# Patient Record
Sex: FEMALE | Race: WHITE
[De-identification: names, ages, dates, MRNs, and addresses within clinical notes are randomized per-mention and may not be internally consistent; named-entity substitution may affect disease eponyms.]

---

## 2017-01-23 ENCOUNTER — TRANSCRIPTION ENCOUNTER (OUTPATIENT)
Age: 47
End: 2017-01-23

## 2017-01-26 ENCOUNTER — APPOINTMENT (OUTPATIENT)
Dept: HEMATOLOGY ONCOLOGY | Facility: CLINIC | Age: 47
End: 2017-01-26

## 2017-01-26 VITALS
DIASTOLIC BLOOD PRESSURE: 97 MMHG | RESPIRATION RATE: 14 BRPM | WEIGHT: 174 LBS | SYSTOLIC BLOOD PRESSURE: 152 MMHG | TEMPERATURE: 98.9 F | HEART RATE: 65 BPM | BODY MASS INDEX: 30.83 KG/M2 | HEIGHT: 63 IN

## 2017-01-26 DIAGNOSIS — Z80.7 FAMILY HISTORY OF OTHER MALIGNANT NEOPLASMS OF LYMPHOID, HEMATOPOIETIC AND RELATED TISSUES: ICD-10-CM

## 2017-01-26 DIAGNOSIS — Z80.3 FAMILY HISTORY OF MALIGNANT NEOPLASM OF BREAST: ICD-10-CM

## 2017-01-26 DIAGNOSIS — K21.9 GASTRO-ESOPHAGEAL REFLUX DISEASE W/OUT ESOPHAGITIS: ICD-10-CM

## 2017-01-26 DIAGNOSIS — E78.00 PURE HYPERCHOLESTEROLEMIA, UNSPECIFIED: ICD-10-CM

## 2017-01-26 DIAGNOSIS — K44.9 GASTRO-ESOPHAGEAL REFLUX DISEASE W/OUT ESOPHAGITIS: ICD-10-CM

## 2017-01-26 DIAGNOSIS — Z80.1 FAMILY HISTORY OF MALIGNANT NEOPLASM OF TRACHEA, BRONCHUS AND LUNG: ICD-10-CM

## 2017-02-01 ENCOUNTER — OUTPATIENT (OUTPATIENT)
Dept: OUTPATIENT SERVICES | Facility: HOSPITAL | Age: 47
LOS: 1 days | Discharge: HOME | End: 2017-02-01

## 2017-02-07 ENCOUNTER — OUTPATIENT (OUTPATIENT)
Dept: OUTPATIENT SERVICES | Facility: HOSPITAL | Age: 47
LOS: 1 days | Discharge: HOME | End: 2017-02-07

## 2017-02-07 ENCOUNTER — APPOINTMENT (OUTPATIENT)
Dept: HEMATOLOGY ONCOLOGY | Facility: CLINIC | Age: 47
End: 2017-02-07

## 2017-02-07 ENCOUNTER — OTHER (OUTPATIENT)
Age: 47
End: 2017-02-07

## 2017-02-14 ENCOUNTER — APPOINTMENT (OUTPATIENT)
Dept: INFUSION THERAPY | Facility: CLINIC | Age: 47
End: 2017-02-14

## 2017-02-16 ENCOUNTER — APPOINTMENT (OUTPATIENT)
Dept: INFUSION THERAPY | Facility: CLINIC | Age: 47
End: 2017-02-16

## 2017-02-28 ENCOUNTER — APPOINTMENT (OUTPATIENT)
Dept: INFUSION THERAPY | Facility: CLINIC | Age: 47
End: 2017-02-28

## 2017-03-02 ENCOUNTER — APPOINTMENT (OUTPATIENT)
Dept: INFUSION THERAPY | Facility: CLINIC | Age: 47
End: 2017-03-02

## 2017-03-02 LAB
BASOPHILS # BLD: 0.03 TH/MM3
BASOPHILS NFR BLD: 1 %
EOSINOPHIL # BLD: 0.09 TH/MM3
EOSINOPHIL NFR BLD: 3 %
ERYTHROCYTE [DISTWIDTH] IN BLOOD BY AUTOMATED COUNT: 12.7 %
GRANULOCYTES # BLD: 0.76 TH/MM3
GRANULOCYTES NFR BLD: 25 %
HCT VFR BLD AUTO: 34.3 %
HGB BLD-MCNC: 11.8 G/DL
IMM GRANULOCYTES # BLD: 0.03 TH/MM3
IMM GRANULOCYTES NFR BLD: 1 %
LYMPHOCYTES # BLD: 1.92 TH/MM3
LYMPHOCYTES NFR BLD: 63.4 %
MCH RBC QN AUTO: 30.3 PG
MCHC RBC AUTO-ENTMCNC: 34.4 G/DL
MCV RBC AUTO: 88.2 FL
MONOCYTES # BLD: 0.2 TH/MM3
MONOCYTES NFR BLD: 6.6 %
PLATELET # BLD: 238 TH/MM3
PMV BLD AUTO: 9.2 FL
RBC # BLD AUTO: 3.89 MIL/MM3
WBC # BLD: 3.03 TH/MM3

## 2017-03-06 ENCOUNTER — APPOINTMENT (OUTPATIENT)
Dept: SURGERY | Facility: CLINIC | Age: 47
End: 2017-03-06

## 2017-03-07 ENCOUNTER — APPOINTMENT (OUTPATIENT)
Dept: INFUSION THERAPY | Facility: CLINIC | Age: 47
End: 2017-03-07

## 2017-03-07 LAB
BASOPHILS # BLD: 0.06 TH/MM3
BASOPHILS NFR BLD: 1.4 %
EOSINOPHIL # BLD: 0.11 TH/MM3
EOSINOPHIL NFR BLD: 2.6 %
ERYTHROCYTE [DISTWIDTH] IN BLOOD BY AUTOMATED COUNT: 13.1 %
GRANULOCYTES # BLD: 0.94 TH/MM3
GRANULOCYTES NFR BLD: 22.6 %
HCT VFR BLD AUTO: 36 %
HGB BLD-MCNC: 12.6 G/DL
IMM GRANULOCYTES # BLD: 0.08 TH/MM3
IMM GRANULOCYTES NFR BLD: 1.9 %
LYMPHOCYTES # BLD: 2.29 TH/MM3
LYMPHOCYTES NFR BLD: 54.8 %
MCH RBC QN AUTO: 31 PG
MCHC RBC AUTO-ENTMCNC: 35 G/DL
MCV RBC AUTO: 88.5 FL
MONOCYTES # BLD: 0.7 TH/MM3
MONOCYTES NFR BLD: 16.7 %
PLATELET # BLD: 341 TH/MM3
PMV BLD AUTO: 8.9 FL
RBC # BLD AUTO: 4.07 MIL/MM3
WBC # BLD: 4.18 TH/MM3

## 2017-03-09 ENCOUNTER — APPOINTMENT (OUTPATIENT)
Dept: INFUSION THERAPY | Facility: CLINIC | Age: 47
End: 2017-03-09

## 2017-03-09 LAB
ALBUMIN SERPL-MCNC: 3.7 G/DL
ALBUMIN/GLOB SERPL: 1.37
ALP SERPL-CCNC: 70 IU/L
ALT SERPL-CCNC: 25 IU/L
ANION GAP SERPL CALC-SCNC: 12 MEQ/L
AST SERPL-CCNC: 57 IU/L
BILIRUB SERPL-MCNC: 0.6 MG/DL
BUN SERPL-MCNC: 14 MG/DL
BUN/CREAT SERPL: 21.5 %
CALCIUM SERPL-MCNC: 9.3 MG/DL
CHLORIDE SERPL-SCNC: 99 MEQ/L
CO2 SERPL-SCNC: 25 MEQ/L
CREAT SERPL-MCNC: 0.65 MG/DL
GFR SERPL CREATININE-BSD FRML MDRD: 97
GLUCOSE SERPL-MCNC: 103 MG/DL
MAGNESIUM SERPL-MCNC: 2.1 MG/DL
POTASSIUM SERPL-SCNC: 4.1 MMOL/L
PROT SERPL-MCNC: 6.4 G/DL
SODIUM SERPL-SCNC: 136 MEQ/L

## 2017-03-10 ENCOUNTER — APPOINTMENT (OUTPATIENT)
Dept: INFUSION THERAPY | Facility: CLINIC | Age: 47
End: 2017-03-10

## 2017-03-22 ENCOUNTER — APPOINTMENT (OUTPATIENT)
Dept: INFUSION THERAPY | Facility: CLINIC | Age: 47
End: 2017-03-22

## 2017-03-22 ENCOUNTER — APPOINTMENT (OUTPATIENT)
Dept: HEMATOLOGY ONCOLOGY | Facility: CLINIC | Age: 47
End: 2017-03-22

## 2017-03-22 VITALS
SYSTOLIC BLOOD PRESSURE: 126 MMHG | DIASTOLIC BLOOD PRESSURE: 80 MMHG | BODY MASS INDEX: 30.48 KG/M2 | TEMPERATURE: 97.5 F | HEIGHT: 63 IN | RESPIRATION RATE: 14 BRPM | HEART RATE: 69 BPM | WEIGHT: 172 LBS

## 2017-03-22 LAB
ALBUMIN SERPL-MCNC: 3.8 G/DL
ALBUMIN/GLOB SERPL: 1.41
ALP SERPL-CCNC: 94 IU/L
ALT SERPL-CCNC: 47 IU/L
ANION GAP SERPL CALC-SCNC: 11 MEQ/L
AST SERPL-CCNC: 69 IU/L
BASOPHILS # BLD: 0.04 TH/MM3
BASOPHILS NFR BLD: 0.9 %
BILIRUB SERPL-MCNC: 0.5 MG/DL
BUN SERPL-MCNC: 6 MG/DL
BUN/CREAT SERPL: 9.7 %
CALCIUM SERPL-MCNC: 9.6 MG/DL
CHLORIDE SERPL-SCNC: 102 MEQ/L
CO2 SERPL-SCNC: 26 MEQ/L
CREAT SERPL-MCNC: 0.62 MG/DL
EOSINOPHIL # BLD: 0.14 TH/MM3
EOSINOPHIL NFR BLD: 3.2 %
ERYTHROCYTE [DISTWIDTH] IN BLOOD BY AUTOMATED COUNT: 14.2 %
GFR SERPL CREATININE-BSD FRML MDRD: 103
GLUCOSE SERPL-MCNC: 99 MG/DL
GRANULOCYTES # BLD: 1.71 TH/MM3
GRANULOCYTES NFR BLD: 39.6 %
HCT VFR BLD AUTO: 42.1 %
HGB BLD-MCNC: 14.8 G/DL
IMM GRANULOCYTES # BLD: 0.01 TH/MM3
IMM GRANULOCYTES NFR BLD: 0.2 %
LYMPHOCYTES # BLD: 1.93 TH/MM3
LYMPHOCYTES NFR BLD: 44.6 %
MAGNESIUM SERPL-MCNC: 2.1 MG/DL
MCH RBC QN AUTO: 30.6 PG
MCHC RBC AUTO-ENTMCNC: 35.2 G/DL
MCV RBC AUTO: 87 FL
MONOCYTES # BLD: 0.5 TH/MM3
MONOCYTES NFR BLD: 11.5 %
PLATELET # BLD: 275 TH/MM3
PMV BLD AUTO: 9.6 FL
POTASSIUM SERPL-SCNC: 4.3 MMOL/L
PROT SERPL-MCNC: 6.5 G/DL
RBC # BLD AUTO: 4.84 MIL/MM3
SODIUM SERPL-SCNC: 139 MEQ/L
WBC # BLD: 4.33 TH/MM3

## 2017-03-23 ENCOUNTER — APPOINTMENT (OUTPATIENT)
Dept: INFUSION THERAPY | Facility: CLINIC | Age: 47
End: 2017-03-23

## 2017-03-24 ENCOUNTER — APPOINTMENT (OUTPATIENT)
Dept: INFUSION THERAPY | Facility: CLINIC | Age: 47
End: 2017-03-24

## 2017-03-24 LAB — CEA SERPL-MCNC: 5.5 NG/ML

## 2017-04-05 ENCOUNTER — APPOINTMENT (OUTPATIENT)
Dept: INFUSION THERAPY | Facility: CLINIC | Age: 47
End: 2017-04-05

## 2017-04-06 LAB
BASOPHILS # BLD: 0.06 TH/MM3
BASOPHILS NFR BLD: 1.1 %
EOSINOPHIL # BLD: 0.15 TH/MM3
EOSINOPHIL NFR BLD: 2.7 %
ERYTHROCYTE [DISTWIDTH] IN BLOOD BY AUTOMATED COUNT: 14.1 %
GRANULOCYTES # BLD: 2.79 TH/MM3
GRANULOCYTES NFR BLD: 50.5 %
HCT VFR BLD AUTO: 35.1 %
HGB BLD-MCNC: 11.9 G/DL
IMM GRANULOCYTES # BLD: 0.15 TH/MM3
IMM GRANULOCYTES NFR BLD: 2.7 %
LYMPHOCYTES # BLD: 1.82 TH/MM3
LYMPHOCYTES NFR BLD: 33 %
MCH RBC QN AUTO: 30.2 PG
MCHC RBC AUTO-ENTMCNC: 33.9 G/DL
MCV RBC AUTO: 89.1 FL
MONOCYTES # BLD: 0.55 TH/MM3
MONOCYTES NFR BLD: 10 %
PLATELET # BLD: 139 TH/MM3
PMV BLD AUTO: 10.5 FL
RBC # BLD AUTO: 3.94 MIL/MM3
WBC # BLD: 5.52 TH/MM3

## 2017-04-07 ENCOUNTER — APPOINTMENT (OUTPATIENT)
Dept: INFUSION THERAPY | Facility: CLINIC | Age: 47
End: 2017-04-07

## 2017-04-19 ENCOUNTER — APPOINTMENT (OUTPATIENT)
Dept: HEMATOLOGY ONCOLOGY | Facility: CLINIC | Age: 47
End: 2017-04-19

## 2017-04-19 ENCOUNTER — APPOINTMENT (OUTPATIENT)
Dept: INFUSION THERAPY | Facility: CLINIC | Age: 47
End: 2017-04-19

## 2017-04-19 ENCOUNTER — RESULT REVIEW (OUTPATIENT)
Age: 47
End: 2017-04-19

## 2017-04-19 VITALS
DIASTOLIC BLOOD PRESSURE: 68 MMHG | RESPIRATION RATE: 14 BRPM | TEMPERATURE: 96.7 F | HEART RATE: 62 BPM | SYSTOLIC BLOOD PRESSURE: 120 MMHG | BODY MASS INDEX: 30.12 KG/M2 | WEIGHT: 170 LBS | HEIGHT: 63 IN

## 2017-04-20 LAB
ALBUMIN SERPL-MCNC: 3.6 G/DL
ALBUMIN/GLOB SERPL: 1.44
ALP SERPL-CCNC: 76 IU/L
ALT SERPL-CCNC: 19 IU/L
ANION GAP SERPL CALC-SCNC: 12 MEQ/L
AST SERPL-CCNC: 36 IU/L
BASOPHILS # BLD: 0.06 TH/MM3
BASOPHILS NFR BLD: 1.1 %
BILIRUB SERPL-MCNC: 0.4 MG/DL
BUN SERPL-MCNC: 10 MG/DL
BUN/CREAT SERPL: 17.5 %
CALCIUM SERPL-MCNC: 9.4 MG/DL
CHLORIDE SERPL-SCNC: 104 MEQ/L
CO2 SERPL-SCNC: 23 MEQ/L
CREAT SERPL-MCNC: 0.57 MG/DL
EOSINOPHIL # BLD: 0.13 TH/MM3
EOSINOPHIL NFR BLD: 2.4 %
ERYTHROCYTE [DISTWIDTH] IN BLOOD BY AUTOMATED COUNT: 15.7 %
GFR SERPL CREATININE-BSD FRML MDRD: 113
GLUCOSE SERPL-MCNC: 101 MG/DL
GRANULOCYTES # BLD: 2.72 TH/MM3
GRANULOCYTES NFR BLD: 49.6 %
HCT VFR BLD AUTO: 35.4 %
HGB BLD-MCNC: 12.1 G/DL
IMM GRANULOCYTES # BLD: 0.07 TH/MM3
IMM GRANULOCYTES NFR BLD: 1.3 %
LYMPHOCYTES # BLD: 2.05 TH/MM3
LYMPHOCYTES NFR BLD: 37.4 %
MCH RBC QN AUTO: 30.6 PG
MCHC RBC AUTO-ENTMCNC: 34.2 G/DL
MCV RBC AUTO: 89.4 FL
MONOCYTES # BLD: 0.45 TH/MM3
MONOCYTES NFR BLD: 8.2 %
PLATELET # BLD: 139 TH/MM3
PMV BLD AUTO: 9.7 FL
POTASSIUM SERPL-SCNC: 4 MMOL/L
PROT SERPL-MCNC: 6.1 G/DL
RBC # BLD AUTO: 3.96 MIL/MM3
SODIUM SERPL-SCNC: 139 MEQ/L
WBC # BLD: 5.48 TH/MM3

## 2017-04-21 ENCOUNTER — APPOINTMENT (OUTPATIENT)
Dept: INFUSION THERAPY | Facility: CLINIC | Age: 47
End: 2017-04-21

## 2017-05-03 ENCOUNTER — APPOINTMENT (OUTPATIENT)
Dept: INFUSION THERAPY | Facility: CLINIC | Age: 47
End: 2017-05-03

## 2017-05-04 LAB
BASOPHILS # BLD: 0.04 TH/MM3
BASOPHILS NFR BLD: 1.1 %
EOSINOPHIL # BLD: 0.06 TH/MM3
EOSINOPHIL NFR BLD: 1.7 %
ERYTHROCYTE [DISTWIDTH] IN BLOOD BY AUTOMATED COUNT: 16.8 %
GRANULOCYTES # BLD: 1.52 TH/MM3
GRANULOCYTES NFR BLD: 42 %
HCT VFR BLD AUTO: 33.9 %
HGB BLD-MCNC: 11.6 G/DL
IMM GRANULOCYTES # BLD: 0.11 TH/MM3
IMM GRANULOCYTES NFR BLD: 3 %
LYMPHOCYTES # BLD: 1.48 TH/MM3
LYMPHOCYTES NFR BLD: 40.9 %
MCH RBC QN AUTO: 31.3 PG
MCHC RBC AUTO-ENTMCNC: 34.2 G/DL
MCV RBC AUTO: 91.4 FL
MONOCYTES # BLD: 0.41 TH/MM3
MONOCYTES NFR BLD: 11.3 %
PLATELET # BLD: 124 TH/MM3
PMV BLD AUTO: 10.2 FL
RBC # BLD AUTO: 3.71 MIL/MM3
WBC # BLD: 3.62 TH/MM3

## 2017-05-05 ENCOUNTER — APPOINTMENT (OUTPATIENT)
Dept: INFUSION THERAPY | Facility: CLINIC | Age: 47
End: 2017-05-05

## 2017-05-17 ENCOUNTER — APPOINTMENT (OUTPATIENT)
Dept: HEMATOLOGY ONCOLOGY | Facility: CLINIC | Age: 47
End: 2017-05-17

## 2017-05-17 ENCOUNTER — APPOINTMENT (OUTPATIENT)
Dept: INFUSION THERAPY | Facility: CLINIC | Age: 47
End: 2017-05-17

## 2017-05-17 VITALS
HEIGHT: 63 IN | SYSTOLIC BLOOD PRESSURE: 116 MMHG | RESPIRATION RATE: 14 BRPM | DIASTOLIC BLOOD PRESSURE: 69 MMHG | TEMPERATURE: 97.4 F | WEIGHT: 168 LBS | BODY MASS INDEX: 29.77 KG/M2 | HEART RATE: 91 BPM

## 2017-05-18 LAB
BASOPHILS # BLD: 0.04 TH/MM3
BASOPHILS NFR BLD: 1.4 %
EOSINOPHIL # BLD: 0.03 TH/MM3
EOSINOPHIL NFR BLD: 1.1 %
ERYTHROCYTE [DISTWIDTH] IN BLOOD BY AUTOMATED COUNT: 16.9 %
GRANULOCYTES # BLD: 0.87 TH/MM3
GRANULOCYTES NFR BLD: 31.5 %
HCT VFR BLD AUTO: 33.3 %
HGB BLD-MCNC: 11.4 G/DL
IMM GRANULOCYTES # BLD: 0.1 TH/MM3
IMM GRANULOCYTES NFR BLD: 3.6 %
LYMPHOCYTES # BLD: 1.31 TH/MM3
LYMPHOCYTES NFR BLD: 47.5 %
MCH RBC QN AUTO: 31.1 PG
MCHC RBC AUTO-ENTMCNC: 34.2 G/DL
MCV RBC AUTO: 91 FL
MONOCYTES # BLD: 0.41 TH/MM3
MONOCYTES NFR BLD: 14.9 %
PLATELET # BLD: 83 TH/MM3
PMV BLD AUTO: 11.3 FL
RBC # BLD AUTO: 3.66 MIL/MM3
WBC # BLD: 2.76 TH/MM3

## 2017-05-19 ENCOUNTER — APPOINTMENT (OUTPATIENT)
Dept: INFUSION THERAPY | Facility: CLINIC | Age: 47
End: 2017-05-19

## 2017-05-24 ENCOUNTER — APPOINTMENT (OUTPATIENT)
Dept: HEMATOLOGY ONCOLOGY | Facility: CLINIC | Age: 47
End: 2017-05-24

## 2017-05-24 ENCOUNTER — APPOINTMENT (OUTPATIENT)
Dept: INFUSION THERAPY | Facility: CLINIC | Age: 47
End: 2017-05-24

## 2017-05-24 VITALS
RESPIRATION RATE: 18 BRPM | SYSTOLIC BLOOD PRESSURE: 130 MMHG | DIASTOLIC BLOOD PRESSURE: 77 MMHG | TEMPERATURE: 96 F | HEART RATE: 73 BPM

## 2017-05-25 LAB
BASOPHILS # BLD: 0.04 TH/MM3
BASOPHILS NFR BLD: 1 %
EOSINOPHIL # BLD: 0.06 TH/MM3
EOSINOPHIL NFR BLD: 1.5 %
ERYTHROCYTE [DISTWIDTH] IN BLOOD BY AUTOMATED COUNT: 17.5 %
GRANULOCYTES # BLD: 1.47 TH/MM3
GRANULOCYTES NFR BLD: 38 %
HCT VFR BLD AUTO: 32.2 %
HGB BLD-MCNC: 10.9 G/DL
IMM GRANULOCYTES # BLD: 0.05 TH/MM3
IMM GRANULOCYTES NFR BLD: 1.3 %
LYMPHOCYTES # BLD: 1.72 TH/MM3
LYMPHOCYTES NFR BLD: 44.3 %
MCH RBC QN AUTO: 31.7 PG
MCHC RBC AUTO-ENTMCNC: 33.9 G/DL
MCV RBC AUTO: 93.6 FL
MONOCYTES # BLD: 0.54 TH/MM3
MONOCYTES NFR BLD: 13.9 %
PLATELET # BLD: 135 TH/MM3
PMV BLD AUTO: 10 FL
RBC # BLD AUTO: 3.44 MIL/MM3
WBC # BLD: 3.88 TH/MM3

## 2017-05-26 ENCOUNTER — APPOINTMENT (OUTPATIENT)
Dept: INFUSION THERAPY | Facility: CLINIC | Age: 47
End: 2017-05-26

## 2017-06-07 ENCOUNTER — APPOINTMENT (OUTPATIENT)
Dept: INFUSION THERAPY | Facility: CLINIC | Age: 47
End: 2017-06-07

## 2017-06-07 ENCOUNTER — APPOINTMENT (OUTPATIENT)
Dept: HEMATOLOGY ONCOLOGY | Facility: CLINIC | Age: 47
End: 2017-06-07

## 2017-06-07 VITALS
BODY MASS INDEX: 29.41 KG/M2 | TEMPERATURE: 97.7 F | WEIGHT: 166 LBS | HEIGHT: 63 IN | DIASTOLIC BLOOD PRESSURE: 81 MMHG | RESPIRATION RATE: 18 BRPM | HEART RATE: 75 BPM | SYSTOLIC BLOOD PRESSURE: 129 MMHG

## 2017-06-08 LAB
ALBUMIN SERPL-MCNC: 3.6 G/DL
ALBUMIN/GLOB SERPL: 1.57
ALP SERPL-CCNC: 84 IU/L
ALT SERPL-CCNC: 13 IU/L
ANION GAP SERPL CALC-SCNC: 10 MEQ/L
AST SERPL-CCNC: 25 IU/L
BASOPHILS # BLD: 0.03 TH/MM3
BASOPHILS NFR BLD: 0.9 %
BILIRUB SERPL-MCNC: 0.6 MG/DL
BUN SERPL-MCNC: 7 MG/DL
BUN/CREAT SERPL: 20 %
CALCIUM SERPL-MCNC: 9 MG/DL
CEA SERPL-MCNC: 6.9 NG/ML
CHLORIDE SERPL-SCNC: 103 MEQ/L
CO2 SERPL-SCNC: 24 MEQ/L
CREAT SERPL-MCNC: 0.35 MG/DL
EOSINOPHIL # BLD: 0.03 TH/MM3
EOSINOPHIL NFR BLD: 0.9 %
ERYTHROCYTE [DISTWIDTH] IN BLOOD BY AUTOMATED COUNT: 16.6 %
GFR SERPL CREATININE-BSD FRML MDRD: 198
GLUCOSE SERPL-MCNC: 120 MG/DL
GRANULOCYTES # BLD: 1.18 TH/MM3
GRANULOCYTES NFR BLD: 37.3 %
HCT VFR BLD AUTO: 33.1 %
HGB BLD-MCNC: 11.3 G/DL
IMM GRANULOCYTES # BLD: 0.05 TH/MM3
IMM GRANULOCYTES NFR BLD: 1.6 %
LYMPHOCYTES # BLD: 1.49 TH/MM3
LYMPHOCYTES NFR BLD: 47 %
MAGNESIUM SERPL-MCNC: 1.9 MG/DL
MCH RBC QN AUTO: 32.1 PG
MCHC RBC AUTO-ENTMCNC: 34.1 G/DL
MCV RBC AUTO: 94 FL
MONOCYTES # BLD: 0.39 TH/MM3
MONOCYTES NFR BLD: 12.3 %
PLATELET # BLD: 151 TH/MM3
PMV BLD AUTO: 9.8 FL
POTASSIUM SERPL-SCNC: 3.8 MMOL/L
PROT SERPL-MCNC: 5.9 G/DL
RBC # BLD AUTO: 3.52 MIL/MM3
SODIUM SERPL-SCNC: 137 MEQ/L
WBC # BLD: 3.17 TH/MM3

## 2017-06-09 ENCOUNTER — APPOINTMENT (OUTPATIENT)
Dept: INFUSION THERAPY | Facility: CLINIC | Age: 47
End: 2017-06-09

## 2017-06-12 ENCOUNTER — APPOINTMENT (OUTPATIENT)
Dept: INFUSION THERAPY | Facility: CLINIC | Age: 47
End: 2017-06-12

## 2017-06-13 ENCOUNTER — APPOINTMENT (OUTPATIENT)
Dept: INFUSION THERAPY | Facility: CLINIC | Age: 47
End: 2017-06-13

## 2017-06-13 ENCOUNTER — OUTPATIENT (OUTPATIENT)
Dept: OUTPATIENT SERVICES | Facility: HOSPITAL | Age: 47
LOS: 1 days | Discharge: HOME | End: 2017-06-13

## 2017-06-13 DIAGNOSIS — R11.2 NAUSEA WITH VOMITING, UNSPECIFIED: ICD-10-CM

## 2017-06-13 DIAGNOSIS — C20 MALIGNANT NEOPLASM OF RECTUM: ICD-10-CM

## 2017-06-13 LAB
ANION GAP SERPL CALC-SCNC: 14 MEQ/L
BASOPHILS # BLD: 0.04 TH/MM3
BASOPHILS NFR BLD: 0.2 %
BUN SERPL-MCNC: 17 MG/DL
BUN/CREAT SERPL: 27.4 %
CALCIUM SERPL-MCNC: 9.5 MG/DL
CHLORIDE SERPL-SCNC: 100 MEQ/L
CO2 SERPL-SCNC: 25 MEQ/L
CREAT SERPL-MCNC: 0.62 MG/DL
EOSINOPHIL # BLD: 0.06 TH/MM3
EOSINOPHIL NFR BLD: 0.3 %
ERYTHROCYTE [DISTWIDTH] IN BLOOD BY AUTOMATED COUNT: 16.2 %
GFR SERPL CREATININE-BSD FRML MDRD: 102
GLUCOSE SERPL-MCNC: 123 MG/DL
GRANULOCYTES # BLD: 14.4 TH/MM3
GRANULOCYTES NFR BLD: 80.2 %
HCT VFR BLD AUTO: 37.6 %
HGB BLD-MCNC: 13.3 G/DL
IMM GRANULOCYTES # BLD: 0.53 TH/MM3
IMM GRANULOCYTES NFR BLD: 2.9 %
LYMPHOCYTES # BLD: 2.43 TH/MM3
LYMPHOCYTES NFR BLD: 13.5 %
MAGNESIUM SERPL-MCNC: 1.9 MG/DL
MCH RBC QN AUTO: 32 PG
MCHC RBC AUTO-ENTMCNC: 35.4 G/DL
MCV RBC AUTO: 90.6 FL
MONOCYTES # BLD: 0.52 TH/MM3
MONOCYTES NFR BLD: 2.9 %
PLATELET # BLD: 190 TH/MM3
PMV BLD AUTO: 9.6 FL
POTASSIUM SERPL-SCNC: 3.1 MMOL/L
RBC # BLD AUTO: 4.15 MIL/MM3
SODIUM SERPL-SCNC: 139 MEQ/L
WBC # BLD: 17.98 TH/MM3

## 2017-06-14 ENCOUNTER — APPOINTMENT (OUTPATIENT)
Dept: INFUSION THERAPY | Facility: CLINIC | Age: 47
End: 2017-06-14

## 2017-06-21 ENCOUNTER — APPOINTMENT (OUTPATIENT)
Dept: INFUSION THERAPY | Facility: CLINIC | Age: 47
End: 2017-06-21

## 2017-06-23 ENCOUNTER — APPOINTMENT (OUTPATIENT)
Dept: INFUSION THERAPY | Facility: CLINIC | Age: 47
End: 2017-06-23

## 2017-06-28 DIAGNOSIS — C19 MALIGNANT NEOPLASM OF RECTOSIGMOID JUNCTION: ICD-10-CM

## 2017-06-29 ENCOUNTER — OUTPATIENT (OUTPATIENT)
Dept: PREADMISSION | Facility: HOSPITAL | Age: 47
LOS: 1 days | Discharge: HOME | End: 2017-06-29

## 2017-06-29 DIAGNOSIS — C20 MALIGNANT NEOPLASM OF RECTUM: ICD-10-CM

## 2017-06-29 DIAGNOSIS — R11.2 NAUSEA WITH VOMITING, UNSPECIFIED: ICD-10-CM

## 2017-07-05 ENCOUNTER — APPOINTMENT (OUTPATIENT)
Dept: SURGERY | Facility: CLINIC | Age: 47
End: 2017-07-05

## 2017-07-05 VITALS
TEMPERATURE: 98.3 F | HEIGHT: 63 IN | DIASTOLIC BLOOD PRESSURE: 83 MMHG | SYSTOLIC BLOOD PRESSURE: 124 MMHG | BODY MASS INDEX: 29.59 KG/M2 | HEART RATE: 84 BPM | WEIGHT: 167 LBS

## 2017-07-10 ENCOUNTER — APPOINTMENT (OUTPATIENT)
Dept: INFUSION THERAPY | Facility: CLINIC | Age: 47
End: 2017-07-10

## 2017-07-10 ENCOUNTER — APPOINTMENT (OUTPATIENT)
Dept: HEMATOLOGY ONCOLOGY | Facility: CLINIC | Age: 47
End: 2017-07-10

## 2017-07-10 VITALS
SYSTOLIC BLOOD PRESSURE: 125 MMHG | RESPIRATION RATE: 14 BRPM | BODY MASS INDEX: 29.77 KG/M2 | DIASTOLIC BLOOD PRESSURE: 97 MMHG | HEIGHT: 63 IN | TEMPERATURE: 98.4 F | WEIGHT: 168 LBS | HEART RATE: 86 BPM

## 2017-07-13 LAB
ALBUMIN SERPL-MCNC: 3.5 G/DL
ALBUMIN/GLOB SERPL: 1.4
ALP SERPL-CCNC: 83 IU/L
ALT SERPL-CCNC: 11 IU/L
ANION GAP SERPL CALC-SCNC: 8 MEQ/L
AST SERPL-CCNC: 22 IU/L
BASOPHILS # BLD: 0.02 TH/MM3
BASOPHILS NFR BLD: 0.4 %
BILIRUB SERPL-MCNC: 0.5 MG/DL
BUN SERPL-MCNC: 9 MG/DL
BUN/CREAT SERPL: 22 %
CALCIUM SERPL-MCNC: 9.5 MG/DL
CEA SERPL-MCNC: 8.2 NG/ML
CHLORIDE SERPL-SCNC: 107 MEQ/L
CO2 SERPL-SCNC: 25 MEQ/L
CREAT SERPL-MCNC: 0.41 MG/DL
EOSINOPHIL # BLD: 0.09 TH/MM3
EOSINOPHIL NFR BLD: 1.8 %
ERYTHROCYTE [DISTWIDTH] IN BLOOD BY AUTOMATED COUNT: 14.4 %
GFR SERPL CREATININE-BSD FRML MDRD: 165
GLUCOSE SERPL-MCNC: 113 MG/DL
GRANULOCYTES # BLD: 3.08 TH/MM3
GRANULOCYTES NFR BLD: 60.6 %
HCT VFR BLD AUTO: 33.6 %
HGB BLD-MCNC: 11.3 G/DL
IMM GRANULOCYTES # BLD: 0 TH/MM3
IMM GRANULOCYTES NFR BLD: 0 %
LYMPHOCYTES # BLD: 1.52 TH/MM3
LYMPHOCYTES NFR BLD: 29.9 %
MCH RBC QN AUTO: 31.9 PG
MCHC RBC AUTO-ENTMCNC: 33.6 G/DL
MCV RBC AUTO: 94.9 FL
MONOCYTES # BLD: 0.37 TH/MM3
MONOCYTES NFR BLD: 7.3 %
PLATELET # BLD: 168 TH/MM3
PMV BLD AUTO: 9 FL
POTASSIUM SERPL-SCNC: 3.6 MMOL/L
PROT SERPL-MCNC: 6 G/DL
RBC # BLD AUTO: 3.54 MIL/MM3
SODIUM SERPL-SCNC: 140 MEQ/L
WBC # BLD: 5.08 TH/MM3

## 2017-07-25 ENCOUNTER — APPOINTMENT (OUTPATIENT)
Dept: INFUSION THERAPY | Facility: CLINIC | Age: 47
End: 2017-07-25

## 2017-07-26 ENCOUNTER — OUTPATIENT (OUTPATIENT)
Dept: OUTPATIENT SERVICES | Facility: HOSPITAL | Age: 47
LOS: 1 days | Discharge: HOME | End: 2017-07-26

## 2017-07-26 DIAGNOSIS — C20 MALIGNANT NEOPLASM OF RECTUM: ICD-10-CM

## 2017-07-26 DIAGNOSIS — R11.2 NAUSEA WITH VOMITING, UNSPECIFIED: ICD-10-CM

## 2017-07-31 ENCOUNTER — APPOINTMENT (OUTPATIENT)
Dept: HEMATOLOGY ONCOLOGY | Facility: CLINIC | Age: 47
End: 2017-07-31

## 2017-07-31 ENCOUNTER — APPOINTMENT (OUTPATIENT)
Dept: INFUSION THERAPY | Facility: CLINIC | Age: 47
End: 2017-07-31

## 2017-07-31 VITALS
TEMPERATURE: 98.6 F | SYSTOLIC BLOOD PRESSURE: 115 MMHG | HEART RATE: 72 BPM | DIASTOLIC BLOOD PRESSURE: 75 MMHG | HEIGHT: 63 IN | WEIGHT: 165 LBS | BODY MASS INDEX: 29.23 KG/M2 | RESPIRATION RATE: 14 BRPM

## 2017-08-01 LAB
ALBUMIN SERPL-MCNC: 3.6 G/DL
ALBUMIN/GLOB SERPL: 1.33
ALP SERPL-CCNC: 68 IU/L
ALT SERPL-CCNC: 13 IU/L
ANION GAP SERPL CALC-SCNC: 10 MEQ/L
AST SERPL-CCNC: 29 IU/L
BASOPHILS # BLD: 0.01 TH/MM3
BASOPHILS NFR BLD: 0.4 %
BILIRUB SERPL-MCNC: 0.7 MG/DL
BUN SERPL-MCNC: 6 MG/DL
BUN/CREAT SERPL: 11.1 %
CALCIUM SERPL-MCNC: 9.3 MG/DL
CHLORIDE SERPL-SCNC: 102 MEQ/L
CO2 SERPL-SCNC: 26 MEQ/L
CREAT SERPL-MCNC: 0.54 MG/DL
EOSINOPHIL # BLD: 0.08 TH/MM3
EOSINOPHIL NFR BLD: 3.5 %
ERYTHROCYTE [DISTWIDTH] IN BLOOD BY AUTOMATED COUNT: 14 %
GFR SERPL CREATININE-BSD FRML MDRD: 120
GLUCOSE SERPL-MCNC: 97 MG/DL
GRANULOCYTES # BLD: 1.49 TH/MM3
GRANULOCYTES NFR BLD: 64.8 %
HCT VFR BLD AUTO: 31.5 %
HGB BLD-MCNC: 10.9 G/DL
IMM GRANULOCYTES # BLD: 0 TH/MM3
IMM GRANULOCYTES NFR BLD: 0 %
LYMPHOCYTES # BLD: 0.51 TH/MM3
LYMPHOCYTES NFR BLD: 22.2 %
MAGNESIUM SERPL-MCNC: 1.9 MG/DL
MCH RBC QN AUTO: 32.3 PG
MCHC RBC AUTO-ENTMCNC: 34.6 G/DL
MCV RBC AUTO: 93.5 FL
MONOCYTES # BLD: 0.21 TH/MM3
MONOCYTES NFR BLD: 9.1 %
PLATELET # BLD: 118 TH/MM3
PMV BLD AUTO: 8.2 FL
POTASSIUM SERPL-SCNC: 3.1 MMOL/L
PROT SERPL-MCNC: 6.3 G/DL
RBC # BLD AUTO: 3.37 MIL/MM3
SODIUM SERPL-SCNC: 138 MEQ/L
WBC # BLD: 2.3 TH/MM3

## 2017-08-07 ENCOUNTER — APPOINTMENT (OUTPATIENT)
Dept: INFUSION THERAPY | Facility: CLINIC | Age: 47
End: 2017-08-07

## 2017-08-07 ENCOUNTER — RESULT REVIEW (OUTPATIENT)
Age: 47
End: 2017-08-07

## 2017-08-07 ENCOUNTER — APPOINTMENT (OUTPATIENT)
Dept: HEMATOLOGY ONCOLOGY | Facility: CLINIC | Age: 47
End: 2017-08-07

## 2017-08-07 VITALS
DIASTOLIC BLOOD PRESSURE: 72 MMHG | HEIGHT: 63 IN | BODY MASS INDEX: 29.06 KG/M2 | SYSTOLIC BLOOD PRESSURE: 123 MMHG | WEIGHT: 164 LBS | RESPIRATION RATE: 12 BRPM | TEMPERATURE: 98.6 F | HEART RATE: 80 BPM

## 2017-08-10 LAB
ALBUMIN SERPL-MCNC: 3.4 G/DL
ALBUMIN/GLOB SERPL: 1.36
ALP SERPL-CCNC: 65 IU/L
ALT SERPL-CCNC: 12 IU/L
ANION GAP SERPL CALC-SCNC: 6 MEQ/L
APPEARANCE UR: CLEAR
AST SERPL-CCNC: 32 IU/L
BACTERIA UR CULT: NORMAL
BASOPHILS # BLD: 0 TH/MM3
BASOPHILS NFR BLD: 0 %
BILIRUB SERPL-MCNC: 0.9 MG/DL
BILIRUB UR QL STRIP: NEGATIVE
BUN SERPL-MCNC: 5 MG/DL
BUN/CREAT SERPL: 9.8 %
CALCIUM SERPL-MCNC: 9 MG/DL
CHLORIDE SERPL-SCNC: 104 MEQ/L
CO2 SERPL-SCNC: 28 MEQ/L
COLOR UR: YELLOW
CREAT SERPL-MCNC: 0.51 MG/DL
EOSINOPHIL # BLD: 0.06 TH/MM3
EOSINOPHIL NFR BLD: 3.2 %
ERYTHROCYTE [DISTWIDTH] IN BLOOD BY AUTOMATED COUNT: 14.9 %
GFR SERPL CREATININE-BSD FRML MDRD: 128
GLUCOSE SERPL-MCNC: 148 MG/DL
GLUCOSE UR STRIP-MCNC: NEGATIVE MG/DL
GRANULOCYTES # BLD: 1.24 TH/MM3
GRANULOCYTES NFR BLD: 65.2 %
HCT VFR BLD AUTO: 29.6 %
HGB BLD-MCNC: 10.2 G/DL
HGB UR QL STRIP: NEGATIVE
IMM GRANULOCYTES # BLD: 0 TH/MM3
IMM GRANULOCYTES NFR BLD: 0 %
KETONES UR STRIP-MCNC: NEGATIVE MG/DL
LYMPHOCYTES # BLD: 0.38 TH/MM3
LYMPHOCYTES NFR BLD: 20 %
MAGNESIUM SERPL-MCNC: 1.8 MG/DL
MCH RBC QN AUTO: 32.6 PG
MCHC RBC AUTO-ENTMCNC: 34.5 G/DL
MCV RBC AUTO: 94.6 FL
MONOCYTES # BLD: 0.22 TH/MM3
MONOCYTES NFR BLD: 11.6 %
NITRITE UR QL STRIP: NEGATIVE
PH UR STRIP: 6
PLATELET # BLD: 128 TH/MM3
PMV BLD AUTO: 9.3 FL
POTASSIUM SERPL-SCNC: 3.1 MMOL/L
PROT SERPL-MCNC: 5.9 G/DL
PROT UR STRIP-MCNC: NEGATIVE MG/DL
RBC # BLD AUTO: 3.13 MIL/MM3
SODIUM SERPL-SCNC: 138 MEQ/L
SP GR UR STRIP: 1.01
URINE COMP/EPITH (NORTH): ABNORMAL
UROBILINOGEN UR STRIP-MCNC: 0.2 MG/DL
WBC # BLD: 1.9 TH/MM3
WBC URNS QL MICRO: ABNORMAL
WBC URNS QL MICRO: ABNORMAL P/HPF

## 2017-08-14 ENCOUNTER — APPOINTMENT (OUTPATIENT)
Dept: INFUSION THERAPY | Facility: CLINIC | Age: 47
End: 2017-08-14

## 2017-08-14 ENCOUNTER — APPOINTMENT (OUTPATIENT)
Dept: HEMATOLOGY ONCOLOGY | Facility: CLINIC | Age: 47
End: 2017-08-14

## 2017-08-14 VITALS
WEIGHT: 162 LBS | DIASTOLIC BLOOD PRESSURE: 76 MMHG | RESPIRATION RATE: 12 BRPM | SYSTOLIC BLOOD PRESSURE: 118 MMHG | HEIGHT: 63 IN | BODY MASS INDEX: 28.7 KG/M2 | TEMPERATURE: 97.1 F | HEART RATE: 69 BPM

## 2017-08-15 LAB
BASOPHILS # BLD: 0.01 TH/MM3
BASOPHILS NFR BLD: 0.4 %
EOSINOPHIL # BLD: 0.07 TH/MM3
EOSINOPHIL NFR BLD: 2.5 %
ERYTHROCYTE [DISTWIDTH] IN BLOOD BY AUTOMATED COUNT: 15.5 %
GRANULOCYTES # BLD: 2.02 TH/MM3
GRANULOCYTES NFR BLD: 73.4 %
HCT VFR BLD AUTO: 28.8 %
HGB BLD-MCNC: 9.9 G/DL
IMM GRANULOCYTES # BLD: 0 TH/MM3
IMM GRANULOCYTES NFR BLD: 0 %
LYMPHOCYTES # BLD: 0.37 TH/MM3
LYMPHOCYTES NFR BLD: 13.5 %
MAGNESIUM SERPL-MCNC: 1.9 MG/DL
MCH RBC QN AUTO: 32.6 PG
MCHC RBC AUTO-ENTMCNC: 34.4 G/DL
MCV RBC AUTO: 94.7 FL
MONOCYTES # BLD: 0.28 TH/MM3
MONOCYTES NFR BLD: 10.2 %
PLATELET # BLD: 140 TH/MM3
PMV BLD AUTO: 9.4 FL
RBC # BLD AUTO: 3.04 MIL/MM3
WBC # BLD: 2.75 TH/MM3

## 2017-08-21 ENCOUNTER — APPOINTMENT (OUTPATIENT)
Dept: INFUSION THERAPY | Facility: CLINIC | Age: 47
End: 2017-08-21

## 2017-08-21 ENCOUNTER — OUTPATIENT (OUTPATIENT)
Dept: OUTPATIENT SERVICES | Facility: HOSPITAL | Age: 47
LOS: 1 days | Discharge: HOME | End: 2017-08-21

## 2017-08-21 DIAGNOSIS — C19 MALIGNANT NEOPLASM OF RECTOSIGMOID JUNCTION: ICD-10-CM

## 2017-08-21 DIAGNOSIS — R11.2 NAUSEA WITH VOMITING, UNSPECIFIED: ICD-10-CM

## 2017-08-21 DIAGNOSIS — C20 MALIGNANT NEOPLASM OF RECTUM: ICD-10-CM

## 2017-08-24 LAB
ANION GAP SERPL CALC-SCNC: 10 MEQ/L
BASOPHILS # BLD: 0.01 TH/MM3
BASOPHILS NFR BLD: 0.3 %
BUN SERPL-MCNC: 3 MG/DL
BUN/CREAT SERPL: 6.1 %
CALCIUM SERPL-MCNC: 9.2 MG/DL
CHLORIDE SERPL-SCNC: 101 MEQ/L
CO2 SERPL-SCNC: 27 MEQ/L
CREAT SERPL-MCNC: 0.49 MG/DL
EOSINOPHIL # BLD: 0.09 TH/MM3
EOSINOPHIL NFR BLD: 2.5 %
ERYTHROCYTE [DISTWIDTH] IN BLOOD BY AUTOMATED COUNT: 16.1 %
GFR SERPL CREATININE-BSD FRML MDRD: 134
GLUCOSE SERPL-MCNC: 119 MG/DL
GRANULOCYTES # BLD: 2.75 TH/MM3
GRANULOCYTES NFR BLD: 76.3 %
HCT VFR BLD AUTO: 29.9 %
HGB BLD-MCNC: 10.5 G/DL
IMM GRANULOCYTES # BLD: 0.01 TH/MM3
IMM GRANULOCYTES NFR BLD: 0.3 %
LYMPHOCYTES # BLD: 0.45 TH/MM3
LYMPHOCYTES NFR BLD: 12.5 %
MAGNESIUM SERPL-MCNC: 1.6 MG/DL
MCH RBC QN AUTO: 33 PG
MCHC RBC AUTO-ENTMCNC: 35.1 G/DL
MCV RBC AUTO: 94 FL
MONOCYTES # BLD: 0.29 TH/MM3
MONOCYTES NFR BLD: 8.1 %
PLATELET # BLD: 168 TH/MM3
PMV BLD AUTO: 8.8 FL
POTASSIUM SERPL-SCNC: 2.7 MMOL/L
RBC # BLD AUTO: 3.18 MIL/MM3
SODIUM SERPL-SCNC: 138 MEQ/L
WBC # BLD: 3.6 TH/MM3

## 2017-08-28 ENCOUNTER — APPOINTMENT (OUTPATIENT)
Dept: INFUSION THERAPY | Facility: CLINIC | Age: 47
End: 2017-08-28

## 2017-08-28 ENCOUNTER — APPOINTMENT (OUTPATIENT)
Dept: HEMATOLOGY ONCOLOGY | Facility: CLINIC | Age: 47
End: 2017-08-28

## 2017-08-31 ENCOUNTER — APPOINTMENT (OUTPATIENT)
Dept: HEMATOLOGY ONCOLOGY | Facility: CLINIC | Age: 47
End: 2017-08-31

## 2017-08-31 ENCOUNTER — APPOINTMENT (OUTPATIENT)
Dept: INFUSION THERAPY | Facility: CLINIC | Age: 47
End: 2017-08-31

## 2017-08-31 VITALS
SYSTOLIC BLOOD PRESSURE: 128 MMHG | RESPIRATION RATE: 14 BRPM | WEIGHT: 160 LBS | TEMPERATURE: 97.3 F | HEART RATE: 72 BPM | BODY MASS INDEX: 28.35 KG/M2 | HEIGHT: 63 IN | DIASTOLIC BLOOD PRESSURE: 84 MMHG

## 2017-09-05 LAB
ALBUMIN SERPL-MCNC: 3.2 G/DL
ALBUMIN/GLOB SERPL: 1.28
ALP SERPL-CCNC: 68 IU/L
ALT SERPL-CCNC: 10 IU/L
ANION GAP SERPL CALC-SCNC: 8 MEQ/L
AST SERPL-CCNC: 24 IU/L
BASOPHILS # BLD: 0.01 TH/MM3
BASOPHILS NFR BLD: 0.4 %
BILIRUB SERPL-MCNC: 0.8 MG/DL
BUN SERPL-MCNC: 6 MG/DL
BUN/CREAT SERPL: 12.2 %
CALCIUM SERPL-MCNC: 9 MG/DL
CEA SERPL-MCNC: 3.4 NG/ML
CHLORIDE SERPL-SCNC: 103 MEQ/L
CO2 SERPL-SCNC: 28 MEQ/L
CREAT SERPL-MCNC: 0.49 MG/DL
EOSINOPHIL # BLD: 0.08 TH/MM3
EOSINOPHIL NFR BLD: 3.5 %
ERYTHROCYTE [DISTWIDTH] IN BLOOD BY AUTOMATED COUNT: 15.7 %
GFR SERPL CREATININE-BSD FRML MDRD: 134
GLUCOSE SERPL-MCNC: 86 MG/DL
GRANULOCYTES # BLD: 1.6 TH/MM3
GRANULOCYTES NFR BLD: 69.9 %
HCT VFR BLD AUTO: 30.4 %
HGB BLD-MCNC: 10.5 G/DL
IMM GRANULOCYTES # BLD: 0 TH/MM3
IMM GRANULOCYTES NFR BLD: 0 %
LYMPHOCYTES # BLD: 0.4 TH/MM3
LYMPHOCYTES NFR BLD: 17.5 %
MCH RBC QN AUTO: 33 PG
MCHC RBC AUTO-ENTMCNC: 34.5 G/DL
MCV RBC AUTO: 95.6 FL
MONOCYTES # BLD: 0.2 TH/MM3
MONOCYTES NFR BLD: 8.7 %
PLATELET # BLD: 156 TH/MM3
PMV BLD AUTO: 8.8 FL
POTASSIUM SERPL-SCNC: 3.4 MMOL/L
PROT SERPL-MCNC: 5.7 G/DL
RBC # BLD AUTO: 3.18 MIL/MM3
SODIUM SERPL-SCNC: 139 MEQ/L
WBC # BLD: 2.29 TH/MM3

## 2017-09-08 ENCOUNTER — RX RENEWAL (OUTPATIENT)
Age: 47
End: 2017-09-08

## 2017-09-08 DIAGNOSIS — F41.9 ANXIETY DISORDER, UNSPECIFIED: ICD-10-CM

## 2017-09-11 PROBLEM — F41.9 ANXIETY: Status: ACTIVE | Noted: 2017-09-11

## 2017-09-22 ENCOUNTER — OUTPATIENT (OUTPATIENT)
Dept: OUTPATIENT SERVICES | Facility: HOSPITAL | Age: 47
LOS: 1 days | Discharge: HOME | End: 2017-09-22

## 2017-09-22 ENCOUNTER — APPOINTMENT (OUTPATIENT)
Dept: INFUSION THERAPY | Facility: CLINIC | Age: 47
End: 2017-09-22

## 2017-09-22 DIAGNOSIS — C19 MALIGNANT NEOPLASM OF RECTOSIGMOID JUNCTION: ICD-10-CM

## 2017-09-22 DIAGNOSIS — R11.2 NAUSEA WITH VOMITING, UNSPECIFIED: ICD-10-CM

## 2017-09-22 DIAGNOSIS — C20 MALIGNANT NEOPLASM OF RECTUM: ICD-10-CM

## 2017-10-02 ENCOUNTER — OUTPATIENT (OUTPATIENT)
Dept: OUTPATIENT SERVICES | Facility: HOSPITAL | Age: 47
LOS: 1 days | Discharge: HOME | End: 2017-10-02

## 2017-10-02 DIAGNOSIS — C20 MALIGNANT NEOPLASM OF RECTUM: ICD-10-CM

## 2017-10-02 DIAGNOSIS — R11.2 NAUSEA WITH VOMITING, UNSPECIFIED: ICD-10-CM

## 2017-10-03 ENCOUNTER — OTHER (OUTPATIENT)
Age: 47
End: 2017-10-03

## 2017-10-04 ENCOUNTER — APPOINTMENT (OUTPATIENT)
Dept: SURGERY | Facility: CLINIC | Age: 47
End: 2017-10-04
Payer: MEDICAID

## 2017-10-04 VITALS
SYSTOLIC BLOOD PRESSURE: 122 MMHG | DIASTOLIC BLOOD PRESSURE: 86 MMHG | TEMPERATURE: 97.8 F | WEIGHT: 162 LBS | HEIGHT: 63 IN | HEART RATE: 72 BPM | BODY MASS INDEX: 28.7 KG/M2

## 2017-10-04 PROCEDURE — 99214 OFFICE O/P EST MOD 30 MIN: CPT

## 2017-10-05 ENCOUNTER — OTHER (OUTPATIENT)
Age: 47
End: 2017-10-05

## 2017-10-10 ENCOUNTER — OUTPATIENT (OUTPATIENT)
Dept: OUTPATIENT SERVICES | Facility: HOSPITAL | Age: 47
LOS: 1 days | Discharge: HOME | End: 2017-10-10

## 2017-10-10 DIAGNOSIS — C20 MALIGNANT NEOPLASM OF RECTUM: ICD-10-CM

## 2017-10-10 DIAGNOSIS — R11.2 NAUSEA WITH VOMITING, UNSPECIFIED: ICD-10-CM

## 2017-10-11 ENCOUNTER — APPOINTMENT (OUTPATIENT)
Dept: HEMATOLOGY ONCOLOGY | Facility: CLINIC | Age: 47
End: 2017-10-11

## 2017-10-11 ENCOUNTER — MESSAGE (OUTPATIENT)
Age: 47
End: 2017-10-11

## 2017-10-11 VITALS
DIASTOLIC BLOOD PRESSURE: 79 MMHG | BODY MASS INDEX: 28.53 KG/M2 | WEIGHT: 161 LBS | HEART RATE: 68 BPM | HEIGHT: 63 IN | RESPIRATION RATE: 14 BRPM | SYSTOLIC BLOOD PRESSURE: 144 MMHG | TEMPERATURE: 97.3 F

## 2017-10-12 ENCOUNTER — OUTPATIENT (OUTPATIENT)
Dept: OUTPATIENT SERVICES | Facility: HOSPITAL | Age: 47
LOS: 1 days | Discharge: HOME | End: 2017-10-12

## 2017-10-12 DIAGNOSIS — C20 MALIGNANT NEOPLASM OF RECTUM: ICD-10-CM

## 2017-10-12 DIAGNOSIS — Z01.818 ENCOUNTER FOR OTHER PREPROCEDURAL EXAMINATION: ICD-10-CM

## 2017-10-12 DIAGNOSIS — C18.7 MALIGNANT NEOPLASM OF SIGMOID COLON: ICD-10-CM

## 2017-10-12 DIAGNOSIS — R11.2 NAUSEA WITH VOMITING, UNSPECIFIED: ICD-10-CM

## 2017-10-13 ENCOUNTER — OUTPATIENT (OUTPATIENT)
Dept: OUTPATIENT SERVICES | Facility: HOSPITAL | Age: 47
LOS: 1 days | Discharge: HOME | End: 2017-10-13

## 2017-10-13 DIAGNOSIS — C20 MALIGNANT NEOPLASM OF RECTUM: ICD-10-CM

## 2017-10-13 DIAGNOSIS — R11.2 NAUSEA WITH VOMITING, UNSPECIFIED: ICD-10-CM

## 2017-10-17 ENCOUNTER — APPOINTMENT (OUTPATIENT)
Dept: CARDIOLOGY | Facility: CLINIC | Age: 47
End: 2017-10-17

## 2017-10-17 ENCOUNTER — OUTPATIENT (OUTPATIENT)
Dept: OUTPATIENT SERVICES | Facility: HOSPITAL | Age: 47
LOS: 1 days | Discharge: HOME | End: 2017-10-17

## 2017-10-17 VITALS
SYSTOLIC BLOOD PRESSURE: 120 MMHG | DIASTOLIC BLOOD PRESSURE: 68 MMHG | HEART RATE: 61 BPM | BODY MASS INDEX: 28.61 KG/M2 | WEIGHT: 161.44 LBS | HEIGHT: 63 IN

## 2017-10-17 DIAGNOSIS — C20 MALIGNANT NEOPLASM OF RECTUM: ICD-10-CM

## 2017-10-17 DIAGNOSIS — R11.2 NAUSEA WITH VOMITING, UNSPECIFIED: ICD-10-CM

## 2017-10-18 ENCOUNTER — APPOINTMENT (OUTPATIENT)
Dept: HEMATOLOGY ONCOLOGY | Facility: CLINIC | Age: 47
End: 2017-10-18

## 2017-10-20 ENCOUNTER — APPOINTMENT (OUTPATIENT)
Dept: INFUSION THERAPY | Facility: CLINIC | Age: 47
End: 2017-10-20

## 2017-10-23 ENCOUNTER — INPATIENT (INPATIENT)
Facility: HOSPITAL | Age: 47
LOS: 3 days | Discharge: HOME | End: 2017-10-27
Attending: COLON & RECTAL SURGERY

## 2017-10-23 DIAGNOSIS — C20 MALIGNANT NEOPLASM OF RECTUM: ICD-10-CM

## 2017-10-23 DIAGNOSIS — R11.2 NAUSEA WITH VOMITING, UNSPECIFIED: ICD-10-CM

## 2017-10-30 ENCOUNTER — INPATIENT (INPATIENT)
Facility: HOSPITAL | Age: 47
LOS: 1 days | Discharge: HOME | End: 2017-11-01
Attending: COLON & RECTAL SURGERY

## 2017-10-30 DIAGNOSIS — R11.2 NAUSEA WITH VOMITING, UNSPECIFIED: ICD-10-CM

## 2017-10-30 DIAGNOSIS — C20 MALIGNANT NEOPLASM OF RECTUM: ICD-10-CM

## 2017-10-30 DIAGNOSIS — G89.18 OTHER ACUTE POSTPROCEDURAL PAIN: ICD-10-CM

## 2017-10-31 ENCOUNTER — APPOINTMENT (OUTPATIENT)
Dept: SURGERY | Facility: CLINIC | Age: 47
End: 2017-10-31

## 2017-11-01 DIAGNOSIS — K91.0 VOMITING FOLLOWING GASTROINTESTINAL SURGERY: ICD-10-CM

## 2017-11-01 DIAGNOSIS — C19 MALIGNANT NEOPLASM OF RECTOSIGMOID JUNCTION: ICD-10-CM
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
## 2017-11-01 DIAGNOSIS — E11.40 TYPE 2 DIABETES MELLITUS WITH DIABETIC NEUROPATHY, UNSPECIFIED: ICD-10-CM

## 2017-11-01 DIAGNOSIS — C77.2 SECONDARY AND UNSPECIFIED MALIGNANT NEOPLASM OF INTRA-ABDOMINAL LYMPH NODES: ICD-10-CM

## 2017-11-01 DIAGNOSIS — K21.9 GASTRO-ESOPHAGEAL REFLUX DISEASE WITHOUT ESOPHAGITIS: ICD-10-CM

## 2017-11-01 DIAGNOSIS — C18.7 MALIGNANT NEOPLASM OF SIGMOID COLON: ICD-10-CM

## 2017-11-01 DIAGNOSIS — E78.00 PURE HYPERCHOLESTEROLEMIA, UNSPECIFIED: ICD-10-CM

## 2017-11-01 DIAGNOSIS — C78.7 SECONDARY MALIGNANT NEOPLASM OF LIVER AND INTRAHEPATIC BILE DUCT: ICD-10-CM

## 2017-11-03 ENCOUNTER — TRANSCRIPTION ENCOUNTER (OUTPATIENT)
Age: 47
End: 2017-11-03

## 2017-11-03 ENCOUNTER — OTHER (OUTPATIENT)
Age: 47
End: 2017-11-03

## 2017-11-08 ENCOUNTER — APPOINTMENT (OUTPATIENT)
Dept: HEMATOLOGY ONCOLOGY | Facility: CLINIC | Age: 47
End: 2017-11-08

## 2017-11-08 ENCOUNTER — OUTPATIENT (OUTPATIENT)
Dept: OUTPATIENT SERVICES | Facility: HOSPITAL | Age: 47
LOS: 1 days | Discharge: HOME | End: 2017-11-08

## 2017-11-08 ENCOUNTER — APPOINTMENT (OUTPATIENT)
Dept: SURGERY | Facility: CLINIC | Age: 47
End: 2017-11-08
Payer: MEDICAID

## 2017-11-08 ENCOUNTER — TRANSCRIPTION ENCOUNTER (OUTPATIENT)
Age: 47
End: 2017-11-08

## 2017-11-08 VITALS
BODY MASS INDEX: 27.29 KG/M2 | HEART RATE: 73 BPM | DIASTOLIC BLOOD PRESSURE: 70 MMHG | HEIGHT: 63 IN | TEMPERATURE: 98.4 F | SYSTOLIC BLOOD PRESSURE: 124 MMHG | WEIGHT: 154 LBS

## 2017-11-08 VITALS
DIASTOLIC BLOOD PRESSURE: 73 MMHG | HEIGHT: 63 IN | HEART RATE: 72 BPM | BODY MASS INDEX: 27.29 KG/M2 | TEMPERATURE: 97.9 F | SYSTOLIC BLOOD PRESSURE: 111 MMHG | RESPIRATION RATE: 16 BRPM | WEIGHT: 154 LBS

## 2017-11-08 LAB
BASOPHILS # BLD: 0.02 TH/MM3
BASOPHILS NFR BLD: 0.4 %
EOSINOPHIL # BLD: 0.38 TH/MM3
EOSINOPHIL NFR BLD: 7.8 %
ERYTHROCYTE [DISTWIDTH] IN BLOOD BY AUTOMATED COUNT: 12.6 %
GRANULOCYTES # BLD: 3.44 TH/MM3
GRANULOCYTES NFR BLD: 70.4 %
HCT VFR BLD AUTO: 31.1 %
HGB BLD-MCNC: 10 G/DL
IMM GRANULOCYTES # BLD: 0.01 TH/MM3
IMM GRANULOCYTES NFR BLD: 0.2 %
LYMPHOCYTES # BLD: 0.71 TH/MM3
LYMPHOCYTES NFR BLD: 14.5 %
MCH RBC QN AUTO: 29.9 PG
MCHC RBC AUTO-ENTMCNC: 32.2 G/DL
MCV RBC AUTO: 93.1 FL
MONOCYTES # BLD: 0.33 TH/MM3
MONOCYTES NFR BLD: 6.7 %
PLATELET # BLD: 420 TH/MM3
PMV BLD AUTO: 8.9 FL
RBC # BLD AUTO: 3.34 MIL/MM3
WBC # BLD: 4.89 TH/MM3

## 2017-11-08 PROCEDURE — 99024 POSTOP FOLLOW-UP VISIT: CPT

## 2017-11-09 LAB
ALBUMIN SERPL-MCNC: 3.3 G/DL
ALBUMIN/GLOB SERPL: 1.1
ALP SERPL-CCNC: 82 IU/L
ALT SERPL-CCNC: 5 IU/L
ANION GAP SERPL CALC-SCNC: 7 MEQ/L
AST SERPL-CCNC: 17 IU/L
BILIRUB SERPL-MCNC: 0.4 MG/DL
BUN SERPL-MCNC: 7 MG/DL
BUN/CREAT SERPL: 10 %
CALCIUM SERPL-MCNC: 9.5 MG/DL
CEA SERPL-MCNC: 2.2 NG/ML
CHLORIDE SERPL-SCNC: 105 MEQ/L
CO2 SERPL-SCNC: 28 MEQ/L
CREAT SERPL-MCNC: 0.7 MG/DL
GFR SERPL CREATININE-BSD FRML MDRD: 89
GLUCOSE SERPL-MCNC: 78 MG/DL
POTASSIUM SERPL-SCNC: 4.4 MMOL/L
PROT SERPL-MCNC: 6.3 G/DL
SODIUM SERPL-SCNC: 140 MEQ/L

## 2017-11-11 ENCOUNTER — MOBILE ON CALL (OUTPATIENT)
Age: 47
End: 2017-11-11

## 2017-11-14 ENCOUNTER — APPOINTMENT (OUTPATIENT)
Dept: INFUSION THERAPY | Facility: CLINIC | Age: 47
End: 2017-11-14

## 2017-11-14 DIAGNOSIS — R10.9 UNSPECIFIED ABDOMINAL PAIN: ICD-10-CM

## 2017-11-14 DIAGNOSIS — E78.5 HYPERLIPIDEMIA, UNSPECIFIED: ICD-10-CM

## 2017-11-14 DIAGNOSIS — C20 MALIGNANT NEOPLASM OF RECTUM: ICD-10-CM

## 2017-11-15 ENCOUNTER — APPOINTMENT (OUTPATIENT)
Dept: INFUSION THERAPY | Facility: CLINIC | Age: 47
End: 2017-11-15

## 2017-11-15 ENCOUNTER — APPOINTMENT (OUTPATIENT)
Dept: HEMATOLOGY ONCOLOGY | Facility: CLINIC | Age: 47
End: 2017-11-15

## 2017-11-15 VITALS
TEMPERATURE: 96.8 F | WEIGHT: 152 LBS | DIASTOLIC BLOOD PRESSURE: 84 MMHG | BODY MASS INDEX: 26.93 KG/M2 | HEIGHT: 63 IN | SYSTOLIC BLOOD PRESSURE: 131 MMHG | RESPIRATION RATE: 14 BRPM | HEART RATE: 72 BPM

## 2017-11-15 DIAGNOSIS — C18.9 MALIGNANT NEOPLASM OF COLON, UNSPECIFIED: ICD-10-CM

## 2017-12-04 ENCOUNTER — APPOINTMENT (OUTPATIENT)
Dept: INFUSION THERAPY | Facility: CLINIC | Age: 47
End: 2017-12-04

## 2017-12-04 ENCOUNTER — RESULT REVIEW (OUTPATIENT)
Age: 47
End: 2017-12-04

## 2017-12-04 ENCOUNTER — APPOINTMENT (OUTPATIENT)
Dept: HEMATOLOGY ONCOLOGY | Facility: CLINIC | Age: 47
End: 2017-12-04

## 2017-12-04 VITALS
TEMPERATURE: 97.2 F | HEART RATE: 72 BPM | RESPIRATION RATE: 14 BRPM | BODY MASS INDEX: 26.22 KG/M2 | SYSTOLIC BLOOD PRESSURE: 113 MMHG | HEIGHT: 63 IN | DIASTOLIC BLOOD PRESSURE: 79 MMHG | WEIGHT: 148 LBS

## 2017-12-04 RX ORDER — GABAPENTIN 300 MG/1
300 CAPSULE ORAL 3 TIMES DAILY
Qty: 90 | Refills: 3 | Status: COMPLETED | COMMUNITY
Start: 2017-09-11 | End: 2017-12-04

## 2017-12-07 ENCOUNTER — OUTPATIENT (OUTPATIENT)
Dept: OUTPATIENT SERVICES | Facility: HOSPITAL | Age: 47
LOS: 1 days | Discharge: HOME | End: 2017-12-07

## 2017-12-07 LAB
ALBUMIN SERPL-MCNC: 3.3 G/DL
ALBUMIN/GLOB SERPL: 1.14
ALP SERPL-CCNC: 89 IU/L
ALT SERPL-CCNC: 8 IU/L
ANION GAP SERPL CALC-SCNC: 9 MEQ/L
AST SERPL-CCNC: 16 IU/L
BILIRUB SERPL-MCNC: 0.5 MG/DL
BUN SERPL-MCNC: 6 MG/DL
BUN/CREAT SERPL: 12.2 %
CALCIUM SERPL-MCNC: 9.1 MG/DL
CEA SERPL-MCNC: 3 NG/ML
CHLORIDE SERPL-SCNC: 101 MEQ/L
CO2 SERPL-SCNC: 29 MEQ/L
CREAT SERPL-MCNC: 0.49 MG/DL
FERRITIN SERPL-MCNC: 131 NG/ML
GFR SERPL CREATININE-BSD FRML MDRD: 134
GLUCOSE SERPL-MCNC: 70 MG/DL
PERCENT SATURATION (NORTH): 10.7 %
POTASSIUM SERPL-SCNC: 3.6 MMOL/L
PROT SERPL-MCNC: 6.2 G/DL
RETICS/RBC NFR: 1.31 %
SODIUM SERPL-SCNC: 139 MEQ/L
TIBC SERPL-MCNC: 261 UG/DL

## 2017-12-08 DIAGNOSIS — R53.81 OTHER MALAISE: ICD-10-CM

## 2017-12-12 ENCOUNTER — OTHER (OUTPATIENT)
Age: 47
End: 2017-12-12

## 2017-12-18 ENCOUNTER — APPOINTMENT (OUTPATIENT)
Dept: INTERNAL MEDICINE | Facility: CLINIC | Age: 47
End: 2017-12-18

## 2017-12-18 ENCOUNTER — OUTPATIENT (OUTPATIENT)
Dept: OUTPATIENT SERVICES | Facility: HOSPITAL | Age: 47
LOS: 1 days | Discharge: HOME | End: 2017-12-18

## 2017-12-18 VITALS
HEART RATE: 62 BPM | HEIGHT: 63 IN | SYSTOLIC BLOOD PRESSURE: 116 MMHG | BODY MASS INDEX: 26.4 KG/M2 | DIASTOLIC BLOOD PRESSURE: 81 MMHG | WEIGHT: 149 LBS

## 2017-12-18 DIAGNOSIS — C20 MALIGNANT NEOPLASM OF RECTUM: ICD-10-CM

## 2017-12-18 DIAGNOSIS — Z86.79 PERSONAL HISTORY OF OTHER DISEASES OF THE CIRCULATORY SYSTEM: ICD-10-CM

## 2017-12-18 DIAGNOSIS — R11.2 NAUSEA WITH VOMITING, UNSPECIFIED: ICD-10-CM

## 2017-12-18 RX ORDER — PROCHLORPERAZINE MALEATE 10 MG/1
10 TABLET ORAL EVERY 6 HOURS
Qty: 30 | Refills: 3 | Status: DISCONTINUED | COMMUNITY
Start: 2017-02-14 | End: 2017-12-18

## 2017-12-18 RX ORDER — ZOLPIDEM TARTRATE 5 MG/1
5 TABLET ORAL
Qty: 14 | Refills: 0 | Status: DISCONTINUED | COMMUNITY
Start: 2017-08-14 | End: 2017-12-18

## 2017-12-18 RX ORDER — OXYCODONE AND ACETAMINOPHEN 5; 325 MG/1; MG/1
5-325 TABLET ORAL
Qty: 28 | Refills: 0 | Status: DISCONTINUED | COMMUNITY
Start: 2017-11-13 | End: 2017-12-18

## 2017-12-18 RX ORDER — POTASSIUM CHLORIDE 1500 MG/1
20 TABLET, FILM COATED, EXTENDED RELEASE ORAL
Qty: 26 | Refills: 0 | Status: DISCONTINUED | COMMUNITY
Start: 2017-08-22 | End: 2017-12-18

## 2017-12-18 RX ORDER — ONDANSETRON 8 MG/1
8 TABLET, ORALLY DISINTEGRATING ORAL 3 TIMES DAILY
Qty: 30 | Refills: 3 | Status: DISCONTINUED | COMMUNITY
Start: 2017-02-03 | End: 2017-12-18

## 2017-12-18 RX ORDER — ALPRAZOLAM 0.5 MG/1
0.5 TABLET ORAL
Qty: 90 | Refills: 0 | Status: DISCONTINUED | COMMUNITY
Start: 2017-09-08 | End: 2017-12-18

## 2017-12-18 RX ORDER — CALCIUM CARBONATE/VITAMIN D3 500-10/5ML
400 LIQUID (ML) ORAL
Qty: 10 | Refills: 0 | Status: DISCONTINUED | COMMUNITY
Start: 2017-08-22 | End: 2017-12-18

## 2017-12-18 RX ORDER — LOPERAMIDE HYDROCHLORIDE 2 MG/1
2 CAPSULE ORAL
Qty: 60 | Refills: 0 | Status: DISCONTINUED | COMMUNITY
Start: 2017-07-31 | End: 2017-12-18

## 2017-12-19 ENCOUNTER — OUTPATIENT (OUTPATIENT)
Dept: OUTPATIENT SERVICES | Facility: HOSPITAL | Age: 47
LOS: 1 days | Discharge: HOME | End: 2017-12-19

## 2017-12-19 DIAGNOSIS — R05 COUGH: ICD-10-CM

## 2017-12-19 DIAGNOSIS — C20 MALIGNANT NEOPLASM OF RECTUM: ICD-10-CM

## 2017-12-19 DIAGNOSIS — R11.2 NAUSEA WITH VOMITING, UNSPECIFIED: ICD-10-CM

## 2018-01-02 ENCOUNTER — OUTPATIENT (OUTPATIENT)
Dept: OUTPATIENT SERVICES | Facility: HOSPITAL | Age: 48
LOS: 1 days | Discharge: HOME | End: 2018-01-02

## 2018-01-02 DIAGNOSIS — R53.81 OTHER MALAISE: ICD-10-CM

## 2018-01-03 ENCOUNTER — APPOINTMENT (OUTPATIENT)
Dept: INFUSION THERAPY | Facility: CLINIC | Age: 48
End: 2018-01-03

## 2018-01-03 ENCOUNTER — APPOINTMENT (OUTPATIENT)
Dept: HEMATOLOGY ONCOLOGY | Facility: CLINIC | Age: 48
End: 2018-01-03

## 2018-01-08 ENCOUNTER — APPOINTMENT (OUTPATIENT)
Dept: INFUSION THERAPY | Facility: CLINIC | Age: 48
End: 2018-01-08

## 2018-01-08 ENCOUNTER — OUTPATIENT (OUTPATIENT)
Dept: OUTPATIENT SERVICES | Facility: HOSPITAL | Age: 48
LOS: 1 days | Discharge: HOME | End: 2018-01-08

## 2018-01-08 ENCOUNTER — APPOINTMENT (OUTPATIENT)
Dept: HEMATOLOGY ONCOLOGY | Facility: CLINIC | Age: 48
End: 2018-01-08

## 2018-01-08 VITALS
TEMPERATURE: 96.6 F | WEIGHT: 152 LBS | SYSTOLIC BLOOD PRESSURE: 128 MMHG | HEART RATE: 70 BPM | BODY MASS INDEX: 26.93 KG/M2 | RESPIRATION RATE: 18 BRPM | HEIGHT: 63 IN | DIASTOLIC BLOOD PRESSURE: 89 MMHG

## 2018-01-08 DIAGNOSIS — C20 MALIGNANT NEOPLASM OF RECTUM: ICD-10-CM

## 2018-01-08 DIAGNOSIS — C19 MALIGNANT NEOPLASM OF RECTOSIGMOID JUNCTION: ICD-10-CM

## 2018-01-12 ENCOUNTER — OUTPATIENT (OUTPATIENT)
Dept: OUTPATIENT SERVICES | Facility: HOSPITAL | Age: 48
LOS: 1 days | Discharge: HOME | End: 2018-01-12

## 2018-01-12 DIAGNOSIS — M25.9 JOINT DISORDER, UNSPECIFIED: ICD-10-CM

## 2018-01-12 DIAGNOSIS — C20 MALIGNANT NEOPLASM OF RECTUM: ICD-10-CM

## 2018-01-12 DIAGNOSIS — R11.2 NAUSEA WITH VOMITING, UNSPECIFIED: ICD-10-CM

## 2018-01-25 ENCOUNTER — APPOINTMENT (OUTPATIENT)
Dept: INTERNAL MEDICINE | Facility: CLINIC | Age: 48
End: 2018-01-25

## 2018-01-30 ENCOUNTER — RESULT REVIEW (OUTPATIENT)
Age: 48
End: 2018-01-30

## 2018-01-30 ENCOUNTER — APPOINTMENT (OUTPATIENT)
Dept: HEMATOLOGY ONCOLOGY | Facility: CLINIC | Age: 48
End: 2018-01-30

## 2018-01-30 ENCOUNTER — APPOINTMENT (OUTPATIENT)
Dept: INFUSION THERAPY | Facility: CLINIC | Age: 48
End: 2018-01-30

## 2018-01-30 VITALS
BODY MASS INDEX: 26.58 KG/M2 | HEIGHT: 63 IN | RESPIRATION RATE: 18 BRPM | HEART RATE: 59 BPM | WEIGHT: 150 LBS | SYSTOLIC BLOOD PRESSURE: 122 MMHG | DIASTOLIC BLOOD PRESSURE: 67 MMHG | TEMPERATURE: 97.7 F

## 2018-01-30 DIAGNOSIS — C20 MALIGNANT NEOPLASM OF RECTUM: ICD-10-CM

## 2018-01-30 DIAGNOSIS — R11.2 NAUSEA WITH VOMITING, UNSPECIFIED: ICD-10-CM

## 2018-01-30 NOTE — REVIEW OF SYSTEMS
[Fatigue] : fatigue [Negative] : Heme/Lymph [Fever] : no fever [Chills] : no chills [Night Sweats] : no night sweats [Recent Change In Weight] : ~T no recent weight change [Cough] : no cough [SOB on Exertion] : no shortness of breath during exertion [Abdominal Pain] : abdominal pain [Vomiting] : vomiting [Diarrhea] : diarrhea [FreeTextEntry7] : Symptoms did resolve  [de-identified] : Neuropathy in feet, numbness, same

## 2018-01-30 NOTE — CONSULT LETTER
[Dear  ___] : Dear  [unfilled], [Courtesy Letter:] : I had the pleasure of seeing your patient, [unfilled], in my office today. [Please see my note below.] : Please see my note below. [Sincerely,] : Sincerely, [FreeTextEntry3] : Cale

## 2018-01-30 NOTE — HISTORY OF PRESENT ILLNESS
[Therapy: ___] : Therapy: [unfilled] [Cycle: ___] : Cycle: [unfilled] [Day: ___] : Day: [unfilled] [de-identified] : 47 year old female, who was having watery diarrhea for couple of months associated with some blood Per rectum on wiping. No naren blood. denies abdominal pain or rectal pain. Associated with weight loss of about 20 lbs over 6 months.\par Following which she had elective colonoscopy and EGD in 9th December 2016, EGD showed normal esophagus and duodenum with erythematous mucosa in the antrum, the pathology of which was chronic gastritis.\par \par Colonoscopy (12/9/2016): External grade 3 hemorrhoids with polyp in the sigmoid colon, 15 cm from the entry site, polypectomy was not done and another sessile polyp in the ascending colon, which was removed by snare polypectomy. 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 \par On 01/16/17, she underwent colonoscopy and was found to have 3 cm frond like/villous, ulcerated and friable mass lesion in the sigmoid colon at 20 cm from entry site, which was oozing blood. Polypectomy was amenable and biopsy was taken. Multiple biopsies were taken. It was suspicious for adenocarcinoma. \par \par Pathology: Fragments of adenocarcinoma, at least intramucosal with foci highly suspicious of invasion. No mismatch repair present.\par \par 01/20/17: Underwent CT abdomen/pelvis: Rectosigmoid  mass,  measuring  5.5  cm  CC,  consistent  with  index  lesion,  with  lower  tumor  margin  approximately  8  cm  from  anal  verge,  within  mid-rectum.  \par   Perisigmoid  partially  necrotic  1.9  x  1.7  cm  lymph  node. 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  Right  adrenal  2  cm  nodule,  indeterminate  on  postcontrast  CT,  however  likely  represents  adenoma;\par \par She was then evaluated by surgical oncology Dr Syed, who after seeing the imaging studies, thought the lesion is more rectal and thought the patient would benefit from receiving neoadjuvant chemoRT and provided there is a good response, can achieve R0 resection.\par MRI Rectum was ordered, which is due on 01/27 and PET CT 01/30.  \par \par The patient is here to discuss her options of chemotherapy, chemoRT and further plan of care.\par \par At this time today, patient does have watery diarrhea and blood OK on wiping. Denies abdominal pain. Weight loss of about 20 lbs in past 6 months. on and off vomiting, denies nausea or fever, chills or rigors. [de-identified] : Recto sigmoid adenocarcinoma [de-identified] : CEA 3.4, CA 19-9: 66.9,  : 17 [de-identified] : CBC: 12.8, platelets: 428, wbc: 6.5\par LFT's: wnl\par Craetinine: 0.65\par Chest x ray: no evidence of acute pulmonary disease. [FreeTextEntry1] : 5FU bolus was stopped due to neutropenia.  She completed 8 cycles of Neoadjuvant FOLFOX and Comoleted Chemoradiation on 8/21/17. She started Adjuvant Capecitabine on January 2018. [de-identified] : March 22nd 2017\par Patient returns for followup today. Since her last visit she underwent an MRI Of the rectum on 1/27/2017 that showed Polypoid rectal mass which straddles the anterior peritoneal reflection  with associated perirectal adenopathy. STAGE:  T3c N1 Mx. Necrotic perirectal lymph node noted on reference CT corresponds to a 1.9 x\par 1.7 cm left adnexal cyst on MRI.\par She also underwent a PET/CT scan In February 2, 2017 and demonstrated Intense pathologic FDG uptake (SUV 25.8 ) coregistering with rectosigmoid mass\par consistent with documented biologic tumor activity. One FDG avid (SUV 8.1) 1.1 x 1.3 cm perirectal lymph node . No other sites of abnormal FDG uptake.\par \par She was presented at our tumor board. Another discussion and review of data we decided to proceed with  neoadjuvant chemotherapy with FOLFOX for 4 months with every 2 months assessments of MRI this is to follow with chemoradiation and than TME.\par A port has been placed. She started FOLFOX and currently presents Prior to cycle 3. Her length of medication so far has been mild intermittent neuropathy and neutropenia and required delaying chemotherapy as well as discontinuation of 5-FU bolus and 3 days of Neupogen.\par \par She has no other complaints.\par \par 4/19/17\par She is doing well except for some nausea for which  Compazine helps. She had an MRI of Rectum after 4 cycles  that showed  4/10/17:  decreased size of the polypoid rectal mass, now predominantly above the peritoneal reflection measuring 3.6 cm in length,\par previously 4.8 cm.\par \par 5/17/17 \par She presents for follow up today. She is doing well. She does have fatigue, no neuropathy. She is loosing her job and disibility and bills may be a problem she is in contact with our social workers.\par \par 6/7/17\par She presents for follow up. Only complaint is fatuge. Her last cycle was delayde due to neutropenia. We dropped the dose of 5FU and Oxoli by 20% and added neupogen for 3 days.  Counts from today still show mild neutropenia. \par \par 7/10/17\par She presents for follow up today. She had MRI rectum that unfortunately showed increase in her tumor. (6/29/17: .  Since April 10, 2017, increased size of polypoid rectal mass, which now straddles the peritoneal reflection measuring 5.6 (previously 3.6cm) and increased perirectal adenopathy; desmoplastic reaction limits evaluation for\par primary tumor staging.\par  She was seen by Dr. Macias and he she was recommended to start chemoradiation. She is Doing well otherwise blood is having a hard time passing stool I recommended she continues with laxatives.\par \par 7/31/17\par Patient comes in for follow up visit, she is on day 12 of 28 of chemoradiation, patient has been having burning and itching in the vaginal and perineal areas patient tried monistat with no relief, she then tried hydrocortisone which worsened the burning. The patient also states that she has been having diarrhea, 12 episodes a day, patient has not been having good intake and is not drinking a lot of water. The patient also endorses nausea and vomiting, she states that the zofran does not help her and makes her more nauseous and the compazine makes her sleepy. Patient also states she is weaker and more tired. Her counts have slowly been trending down due to radiation. \par \par 8/7/17\par Patient comes in today for follow up, feels weak and tired, states that she is still having diarrhea but it has improved. She was prescribed immodium on the last visit which she took, but it made her constipated and when she stopped taking it, there was severe diarrhea. The patient also has nausea with some mild vomiting and dry heaves. The patient also states that she has no appetite and is not eating much. The patient continues to have burning and pruritis in the perineal and vaginal areas and was prescribed aquaphor cream by radiation with little relief. \par \par 8/14/17\par Still has 8-12 bowel movements and describes it as diarrhea, some are formed but mainly liquid but not so much.  She stopped Imodium since it made her not go at all. Still has 6 days of chemoradiation. Dizzy rarely. Drinks 16-32 oz of gatorade. Ucx was normal.\par \par 8/31/17\par She feels much better now. Skin feels better and is eating. \par \par \par 10/11/17\par She is her for follow up. She had a MR of rectum 10/2/17 that showed  Since June 29, 2017, decreased size polypoid rectal mass now measuring 4.5\par cm (previously 5.6 cm), with overall unchanged perirectal adenopathy;\par desmoplastic reaction limits evaluation for primary tumor staging.   New 0.9 cm enhancing lesion in the left iliac bone, suspicious for osseous\par metastasis; further evaluation with nuclear medicine bone scan may be of use.\par I reviewed the images with Dr. Clark and the bone finding on MR has been there on all the previous MR and have not changed in size but did enhance more. He though it may have been even a cyst. He also recommended a bone scan  to make sure. She had a Bone scan on 10/10/17 No evidence for metastatic bone disease. Specifically, no abnormal radiotracer uptake within left iliac bone.\par \par She feels well otherwise and she scheduled to undergo surgery  on October 23. Her PET/CT scan is still pending.\par \par \par 11/16/17\par She is here for follow up. She underwent  PET CT scan on 10/13/17 that did not show any distant mets. She than a underwent robotic assisted low anterior resection on October 23, 2017. 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 It was R0 resection. \par \par She was presented in our tumor board and further adjuvant chemotherapy was recommended. She saw Dr. Collins who recommended capecitabine 1250 mg/m2 bid\par \par She recovered well from surgery. She has numnbess in her feet from previous oxalipaltin. Gabapentin  is not helping.\par \par 12/4/17\par She is here for follow up.  She was seen in [facility] and they recommneded capecitabine 1500 mg BID 2 weeks on and 1 week off for 3 cycles. Given her weight loss and her bowel movements not normalizing they recommended to hold capecitabine for now, have her see PMD, see physical therapy and to have restaging PET/CT and MR Pelvis prior to restarting adjuvant chemotherapy.\par \par She does feel depressed but that is because she cant pay her bills. She did not want to see pshychiatrist. She also has a cough, dry for 4 weeks but did have a URI about 4 weeks ago. No other complaints.\par \par 1/8/18\par She is here for follow up. She had a PET CT 12/19/18 that most likely showed post surgical changes and an MR is pending next week for conformation. Emotionally she is better. She has gained weight. \par \par 1/30/18\par She is here for follow up. She had the MR of pelvis on 1/12/18: Status post lower anterior rectal resection, without MRI evidence of tumor recurrence. She was started on her 1st of 3 cycles of Capecitabine.  She developed nausea and diarrhea on her day 15 of capecitabine and stopped. She though she needs to take 21 days on and 7 days off. She only took 14 days. The nausea and vomiting and diarrhea resolve and may have been due to a viral illness. \par

## 2018-01-30 NOTE — RESULTS/DATA
[FreeTextEntry1] : Check 5 on Selected Components for PET CT WHOLE BODY SKULL BASE TO MID- \par     TEST: PET CT WHOLE BODY SKULL BASE TO MID-\par  \par     Collected Date & Time: 2017 13:36\par  \par        \par     Result Name Results Units Reference Range \par      PET CT WHOLE BODY SKULL BASE TO MID-   SEE TEXT       \par     Patient Name: IVANNA NIEVES : 1970\par Patient ID: [id number]\par Account: [id number]\par Patient Location: [facility]\par Accession: [id number]\par Procedure: PET CT WHOLE BODY SKULL BASE TO MID-THIGH SUBSEQUENT [id number]\par Date of Exam: 2017 01:36 PM\par Attending Physician: GIULIANA REBOLLEDO\par Requesting Physician: GIULIANA REBOLLEDO MD\par ivanna nieves\par FDG PET CT STUDY, SUBSEQUENT TREATMENT STRATEGY\par REASON: TUMOR IMAGING - PET with concurrently acquired CT for attenuation\par correction and anatomic localization; skull base to mid - thigh /\par 55181/colorectal carcinoma, subsequent treatment strategy\par ICD10M code:C 21;C 18.9\par HISTORY: This a  47 -year-old patient with rectosigmoid adenocarcinoma\par diagnosed 2017 status post chemoradiation therapy and underwent \par robotic\par assisted low anterior resection 10/23/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 \par T3\par N1b-R0 resection\par FDG PET CT study 10/13/2017 compared  showed no new definite sites of\par pathologic FDG uptake; anatomically decreased size of FDG avid mid to high\par rectal mass approximately 4 cm in length with SUV 16.9 vs 25.8; focal mildly\par increased uptake in 0.6 cm subcutaneous lesion on the left back (SUV 3) likely\par infectious or inflammatory; interval resolution of previously described FDG \par avid\par perirectal lymph node; no abnormal radiotracer uptake at the site of left \par iliac\par focus of increased enhancement on MRI (negative bone scan October 10, 2017)\par CT of abdomen and pelvis 2017 showed presacral fluid and air which\par may be postoperative change\par Last chemotherapy and radiation therapy 2017\par  Blood glucose pre injection 108 mg/dL\par TECHNIQUE: Approximately 45 minutes after the intravenous administration of\par 12.5 mCi 18-Fluorine FDG, whole body PET images were acquired from base of \par skull\par to mid - thigh. In addition, non-contrast, low dose, non - diagnostic CT was\par acquired for attenuation correction and anatomic correlation purposes only.\par These images reveal compared to 10/13/2017, a new finding of abnormal FDG\par uptake (SUV 5.6) in the presacral region coregistering with air collection \par with\par peripheral fluid/phlegmonous change on review with body imaging. The sacral \par air\par and fluid collection has improved compared to 2017..\par Previous FDG avid rectal mass has been surgically resected.\par Previous focus of uptake in the left back subcutaneous lesion is no longer\par present and probably represented inflammatory uptake at that time\par No other sites of abnormal FDG uptake. The site of MRI abnormality in the left\par iliac bone is again non-FDG avid\par IMPRESSION:\par Compared to 10/13/2017, a new finding of abnormal FDG uptake (SUV 5.6) in the\par presacral region coregistering with air collection with peripheral fluid and\par edema - (on CT, the presacral air collection and edema has improved compared \par to\par 10/31/2017) ..\par Previous FDG avid rectal mass has been surgically resected.\par The site of MRI abnormality in the left iliac bone is again non-FDG avid\par No other sites of abnormal FDG uptake\par RIOS BAUTISTA D.O., M.M.M, C.P.E.\par Spoke with GIULIANA REBOLLEDO MD on 2017 3:28 PM with readback. We\par discussed the fact that it is unclear whether this represents postoperative\par change, less likely inflammatory uptake versus tumor recurrence. The patient \par is\par already being scheduled for repeat MRI, it may be that the MRI will help to\par differentiate between recurrence and post operative change\par I the attending attest that I have personally reviewed these images and agree\par with this report.\par Original final report dictated and signed by Dr. Rios Bautista and Dr. vickey Velazquez\par Zl[place]ower on 2017 03:33 PM\par  \par

## 2018-01-30 NOTE — ASSESSMENT
[Curative] : Goals of care discussed with patient: Curative [FreeTextEntry1] : 47  year old  female with clinical T3CN1 rectosigmoid adenocarcinoma on neoadjuvant chemotherapy with FOLFOX  s/p 8 cycles  then to be followed by chemoradiation than TME. MRI showed response after cycle 4 but progression post cycle 8 surgery was not recommended t and she   completed   Chemoradiation 8/21/17.  Her CEA normalized but post treatment MR rectum showed response to tumor but a   a 0.9 cm enhancing lesion in the left iliac bone was found.  All are MRIs were reviewed and apparently that lesion has been there from the beginning but was not enhancing. It is not seen on the CAT scan. Bone scan was negative. Thus was felt to be a benign finding, and enhancement near the due to treatment effect. Her PET was negative  She than a underwent robotic assisted low anterior resection on October 23, 2017. 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 It was R0 resection. \par \par Weight loss and decondition\par -Multifactorial. Repeat PET.CT and MR pelvis was negative.\par \par Cough \par resolved.\par \par Recent Diarrhea and Nausea and vomiting due to viral illness  vs capecitabine ( less likely)\par \par Plan:\par - c/w cycle 2 Capecitabine 1500 mg BID d1-14 every 21 days for 3 cycles to complete her adjuvant course.\par -we went over again that she needs to take it 2 weeks on and 1 week off\par -RTC in 3 weeks \par \par

## 2018-01-30 NOTE — PHYSICAL EXAM
[Fully active, able to carry on all pre-disease performance without restriction] : Status 0 - Fully active, able to carry on all pre-disease performance without restriction [Obese] : obese [Normal] : affect appropriate [Ulcers] : no ulcers [Mucositis] : no mucositis [Thrush] : no thrush [Vesicles] : no vesicles [de-identified] : Port in place

## 2018-01-30 NOTE — REASON FOR VISIT
[Follow-Up Visit] : a follow-up [Initial Consultation] : an initial consultation [Family Member] : family member [FreeTextEntry2] : Recto-Sigmoid Adenocarcinoma

## 2018-02-04 DIAGNOSIS — C20 MALIGNANT NEOPLASM OF RECTUM: ICD-10-CM

## 2018-02-04 DIAGNOSIS — R11.2 NAUSEA WITH VOMITING, UNSPECIFIED: ICD-10-CM

## 2018-02-21 ENCOUNTER — LABORATORY RESULT (OUTPATIENT)
Age: 48
End: 2018-02-21

## 2018-02-21 ENCOUNTER — TRANSCRIPTION ENCOUNTER (OUTPATIENT)
Age: 48
End: 2018-02-21

## 2018-02-21 ENCOUNTER — APPOINTMENT (OUTPATIENT)
Dept: INFUSION THERAPY | Facility: CLINIC | Age: 48
End: 2018-02-21

## 2018-02-21 ENCOUNTER — APPOINTMENT (OUTPATIENT)
Dept: HEMATOLOGY ONCOLOGY | Facility: CLINIC | Age: 48
End: 2018-02-21

## 2018-02-21 VITALS
SYSTOLIC BLOOD PRESSURE: 120 MMHG | HEIGHT: 63 IN | WEIGHT: 156 LBS | RESPIRATION RATE: 18 BRPM | HEART RATE: 62 BPM | TEMPERATURE: 96.6 F | BODY MASS INDEX: 27.64 KG/M2 | DIASTOLIC BLOOD PRESSURE: 93 MMHG

## 2018-02-21 DIAGNOSIS — K64.9 UNSPECIFIED HEMORRHOIDS: ICD-10-CM

## 2018-02-21 LAB
ALBUMIN SERPL ELPH-MCNC: 4 G/DL
ALP BLD-CCNC: 112 U/L
ALT SERPL-CCNC: 7 U/L
ANION GAP SERPL CALC-SCNC: 16 MMOL/L
AST SERPL-CCNC: 16 U/L
BILIRUB SERPL-MCNC: 0.4 MG/DL
BUN SERPL-MCNC: 16 MG/DL
CALCIUM SERPL-MCNC: 9.3 MG/DL
CHLORIDE SERPL-SCNC: 99 MMOL/L
CO2 SERPL-SCNC: 26 MMOL/L
CREAT SERPL-MCNC: 0.6 MG/DL
GLUCOSE SERPL-MCNC: 99 MG/DL
HCT VFR BLD CALC: 33.2 %
HGB BLD-MCNC: 11 G/DL
MCHC RBC-ENTMCNC: 30.6 PG
MCHC RBC-ENTMCNC: 33.1 G/DL
MCV RBC AUTO: 92.2 FL
PLATELET # BLD AUTO: 262 K/UL
PMV BLD: 8.9 FL
POTASSIUM SERPL-SCNC: 4 MMOL/L
PROT SERPL-MCNC: 6.6 G/DL
RBC # BLD: 3.6 M/UL
RBC # FLD: 16.7 %
SODIUM SERPL-SCNC: 141 MMOL/L
WBC # FLD AUTO: 4.97 K/UL

## 2018-02-21 NOTE — RESULTS/DATA
[FreeTextEntry1] : Check 5 on Selected Components for PET CT WHOLE BODY SKULL BASE TO MID- \par     TEST: PET CT WHOLE BODY SKULL BASE TO MID-\par  \par     Collected Date & Time: 2017 13:36\par  \par        \par     Result Name Results Units Reference Range \par      PET CT WHOLE BODY SKULL BASE TO MID-   SEE TEXT       \par     Patient Name: IVANNA NIEVES : 1970\par Patient ID: [id number]\par Account: [id number]\par Patient Location: [facility]\par Accession: [id number]\par Procedure: PET CT WHOLE BODY SKULL BASE TO MID-THIGH SUBSEQUENT [id number]\par Date of Exam: 2017 01:36 PM\par Attending Physician: GIULIANA REBOLLEDO\par Requesting Physician: GIULIANA REBOLLEDO MD\par ivanna nieves\par FDG PET CT STUDY, SUBSEQUENT TREATMENT STRATEGY\par REASON: TUMOR IMAGING - PET with concurrently acquired CT for attenuation\par correction and anatomic localization; skull base to mid - thigh /\par 47559/colorectal carcinoma, subsequent treatment strategy\par ICD10M code:C 21;C 18.9\par HISTORY: This a  47 -year-old patient with rectosigmoid adenocarcinoma\par diagnosed 2017 status post chemoradiation therapy and underwent \par robotic\par assisted low anterior resection 10/23/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 \par T3\par N1b-R0 resection\par FDG PET CT study 10/13/2017 compared  showed no new definite sites of\par pathologic FDG uptake; anatomically decreased size of FDG avid mid to high\par rectal mass approximately 4 cm in length with SUV 16.9 vs 25.8; focal mildly\par increased uptake in 0.6 cm subcutaneous lesion on the left back (SUV 3) likely\par infectious or inflammatory; interval resolution of previously described FDG \par avid\par perirectal lymph node; no abnormal radiotracer uptake at the site of left \par iliac\par focus of increased enhancement on MRI (negative bone scan October 10, 2017)\par CT of abdomen and pelvis 2017 showed presacral fluid and air which\par may be postoperative change\par Last chemotherapy and radiation therapy 2017\par  Blood glucose pre injection 108 mg/dL\par TECHNIQUE: Approximately 45 minutes after the intravenous administration of\par 12.5 mCi 18-Fluorine FDG, whole body PET images were acquired from base of \par skull\par to mid - thigh. In addition, non-contrast, low dose, non - diagnostic CT was\par acquired for attenuation correction and anatomic correlation purposes only.\par These images reveal compared to 10/13/2017, a new finding of abnormal FDG\par uptake (SUV 5.6) in the presacral region coregistering with air collection \par with\par peripheral fluid/phlegmonous change on review with body imaging. The sacral \par air\par and fluid collection has improved compared to 2017..\par Previous FDG avid rectal mass has been surgically resected.\par Previous focus of uptake in the left back subcutaneous lesion is no longer\par present and probably represented inflammatory uptake at that time\par No other sites of abnormal FDG uptake. The site of MRI abnormality in the left\par iliac bone is again non-FDG avid\par IMPRESSION:\par Compared to 10/13/2017, a new finding of abnormal FDG uptake (SUV 5.6) in the\par presacral region coregistering with air collection with peripheral fluid and\par edema - (on CT, the presacral air collection and edema has improved compared \par to\par 10/31/2017) ..\par Previous FDG avid rectal mass has been surgically resected.\par The site of MRI abnormality in the left iliac bone is again non-FDG avid\par No other sites of abnormal FDG uptake\par RIOS BAUTISTA D.O., M.M.M, C.P.E.\par Spoke with GIULIANA REBOLLEDO MD on 2017 3:28 PM with readback. We\par discussed the fact that it is unclear whether this represents postoperative\par change, less likely inflammatory uptake versus tumor recurrence. The patient \par is\par already being scheduled for repeat MRI, it may be that the MRI will help to\par differentiate between recurrence and post operative change\par I the attending attest that I have personally reviewed these images and agree\par with this report.\par Original final report dictated and signed by Dr. Rios Bautista and Dr. vickey Velazquez\par [place]ower on 2017 03:33 PM\par  \par

## 2018-02-21 NOTE — HISTORY OF PRESENT ILLNESS
[Therapy: ___] : Therapy: [unfilled] [Cycle: ___] : Cycle: [unfilled] [Day: ___] : Day: [unfilled] [de-identified] : 47 year old female, who was having watery diarrhea for couple of months associated with some blood Per rectum on wiping. No naren blood. denies abdominal pain or rectal pain. Associated with weight loss of about 20 lbs over 6 months.\par Following which she had elective colonoscopy and EGD in 9th December 2016, EGD showed normal esophagus and duodenum with erythematous mucosa in the antrum, the pathology of which was chronic gastritis.\par \par Colonoscopy (12/9/2016): External grade 3 hemorrhoids with polyp in the sigmoid colon, 15 cm from the entry site, polypectomy was not done and another sessile polyp in the ascending colon, which was removed by snare polypectomy. 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 \par On 01/16/17, she underwent colonoscopy and was found to have 3 cm frond like/villous, ulcerated and friable mass lesion in the sigmoid colon at 20 cm from entry site, which was oozing blood. Polypectomy was amenable and biopsy was taken. Multiple biopsies were taken. It was suspicious for adenocarcinoma. \par \par Pathology: Fragments of adenocarcinoma, at least intramucosal with foci highly suspicious of invasion. No mismatch repair present.\par \par 01/20/17: Underwent CT abdomen/pelvis: Rectosigmoid  mass,  measuring  5.5  cm  CC,  consistent  with  index  lesion,  with  lower  tumor  margin  approximately  8  cm  from  anal  verge,  within  mid-rectum.  \par   Perisigmoid  partially  necrotic  1.9  x  1.7  cm  lymph  node. 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  Right  adrenal  2  cm  nodule,  indeterminate  on  postcontrast  CT,  however  likely  represents  adenoma;\par \par She was then evaluated by surgical oncology Dr Syed, who after seeing the imaging studies, thought the lesion is more rectal and thought the patient would benefit from receiving neoadjuvant chemoRT and provided there is a good response, can achieve R0 resection.\par MRI Rectum was ordered, which is due on 01/27 and PET CT 01/30.  \par \par The patient is here to discuss her options of chemotherapy, chemoRT and further plan of care.\par \par At this time today, patient does have watery diarrhea and blood IN on wiping. Denies abdominal pain. Weight loss of about 20 lbs in past 6 months. on and off vomiting, denies nausea or fever, chills or rigors. [de-identified] : Recto sigmoid adenocarcinoma [de-identified] : CEA 3.4, CA 19-9: 66.9,  : 17 [de-identified] : CBC: 12.8, platelets: 428, wbc: 6.5\par LFT's: wnl\par Craetinine: 0.65\par Chest x ray: no evidence of acute pulmonary disease. [FreeTextEntry1] : 5FU bolus was stopped due to neutropenia.  She completed 8 cycles of Neoadjuvant FOLFOX and Comoleted Chemoradiation on 8/21/17. She started Adjuvant Capecitabine on January 2018. [de-identified] : March 22nd 2017\par Patient returns for followup today. Since her last visit she underwent an MRI Of the rectum on 1/27/2017 that showed Polypoid rectal mass which straddles the anterior peritoneal reflection  with associated perirectal adenopathy. STAGE:  T3c N1 Mx. Necrotic perirectal lymph node noted on reference CT corresponds to a 1.9 x\par 1.7 cm left adnexal cyst on MRI.\par She also underwent a PET/CT scan In February 2, 2017 and demonstrated Intense pathologic FDG uptake (SUV 25.8 ) coregistering with rectosigmoid mass\par consistent with documented biologic tumor activity. One FDG avid (SUV 8.1) 1.1 x 1.3 cm perirectal lymph node . No other sites of abnormal FDG uptake.\par \par She was presented at our tumor board. Another discussion and review of data we decided to proceed with  neoadjuvant chemotherapy with FOLFOX for 4 months with every 2 months assessments of MRI this is to follow with chemoradiation and than TME.\par A port has been placed. She started FOLFOX and currently presents Prior to cycle 3. Her length of medication so far has been mild intermittent neuropathy and neutropenia and required delaying chemotherapy as well as discontinuation of 5-FU bolus and 3 days of Neupogen.\par \par She has no other complaints.\par \par 4/19/17\par She is doing well except for some nausea for which  Compazine helps. She had an MRI of Rectum after 4 cycles  that showed  4/10/17:  decreased size of the polypoid rectal mass, now predominantly above the peritoneal reflection measuring 3.6 cm in length,\par previously 4.8 cm.\par \par 5/17/17 \par She presents for follow up today. She is doing well. She does have fatigue, no neuropathy. She is loosing her job and disibility and bills may be a problem she is in contact with our social workers.\par \par 6/7/17\par She presents for follow up. Only complaint is fatuge. Her last cycle was delayde due to neutropenia. We dropped the dose of 5FU and Oxoli by 20% and added neupogen for 3 days.  Counts from today still show mild neutropenia. \par \par 7/10/17\par She presents for follow up today. She had MRI rectum that unfortunately showed increase in her tumor. (6/29/17: .  Since April 10, 2017, increased size of polypoid rectal mass, which now straddles the peritoneal reflection measuring 5.6 (previously 3.6cm) and increased perirectal adenopathy; desmoplastic reaction limits evaluation for\par primary tumor staging.\par  She was seen by Dr. Macias and he she was recommended to start chemoradiation. She is Doing well otherwise blood is having a hard time passing stool I recommended she continues with laxatives.\par \par 7/31/17\par Patient comes in for follow up visit, she is on day 12 of 28 of chemoradiation, patient has been having burning and itching in the vaginal and perineal areas patient tried monistat with no relief, she then tried hydrocortisone which worsened the burning. The patient also states that she has been having diarrhea, 12 episodes a day, patient has not been having good intake and is not drinking a lot of water. The patient also endorses nausea and vomiting, she states that the zofran does not help her and makes her more nauseous and the compazine makes her sleepy. Patient also states she is weaker and more tired. Her counts have slowly been trending down due to radiation. \par \par 8/7/17\par Patient comes in today for follow up, feels weak and tired, states that she is still having diarrhea but it has improved. She was prescribed immodium on the last visit which she took, but it made her constipated and when she stopped taking it, there was severe diarrhea. The patient also has nausea with some mild vomiting and dry heaves. The patient also states that she has no appetite and is not eating much. The patient continues to have burning and pruritis in the perineal and vaginal areas and was prescribed aquaphor cream by radiation with little relief. \par \par 8/14/17\par Still has 8-12 bowel movements and describes it as diarrhea, some are formed but mainly liquid but not so much.  She stopped Imodium since it made her not go at all. Still has 6 days of chemoradiation. Dizzy rarely. Drinks 16-32 oz of gatorade. Ucx was normal.\par \par 8/31/17\par She feels much better now. Skin feels better and is eating. \par \par \par 10/11/17\par She is her for follow up. She had a MR of rectum 10/2/17 that showed  Since June 29, 2017, decreased size polypoid rectal mass now measuring 4.5\par cm (previously 5.6 cm), with overall unchanged perirectal adenopathy;\par desmoplastic reaction limits evaluation for primary tumor staging.   New 0.9 cm enhancing lesion in the left iliac bone, suspicious for osseous\par metastasis; further evaluation with nuclear medicine bone scan may be of use.\par I reviewed the images with Dr. Clark and the bone finding on MR has been there on all the previous MR and have not changed in size but did enhance more. He though it may have been even a cyst. He also recommended a bone scan  to make sure. She had a Bone scan on 10/10/17 No evidence for metastatic bone disease. Specifically, no abnormal radiotracer uptake within left iliac bone.\par \par She feels well otherwise and she scheduled to undergo surgery  on October 23. Her PET/CT scan is still pending.\par \par \par 11/16/17\par She is here for follow up. She underwent  PET CT scan on 10/13/17 that did not show any distant mets. She than a underwent robotic assisted low anterior resection on October 23, 2017. 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 It was R0 resection. \par \par She was presented in our tumor board and further adjuvant chemotherapy was recommended. She saw Dr. Collins who recommended capecitabine 1250 mg/m2 bid\par \par She recovered well from surgery. She has numnbess in her feet from previous oxalipaltin. Gabapentin  is not helping.\par \par 12/4/17\par She is here for follow up.  She was seen in [facility] and they recommneded capecitabine 1500 mg BID 2 weeks on and 1 week off for 3 cycles. Given her weight loss and her bowel movements not normalizing they recommended to hold capecitabine for now, have her see PMD, see physical therapy and to have restaging PET/CT and MR Pelvis prior to restarting adjuvant chemotherapy.\par \par She does feel depressed but that is because she cant pay her bills. She did not want to see pshychiatrist. She also has a cough, dry for 4 weeks but did have a URI about 4 weeks ago. No other complaints.\par \par 1/8/18\par She is here for follow up. She had a PET CT 12/19/18 that most likely showed post surgical changes and an MR is pending next week for conformation. Emotionally she is better. She has gained weight. \par \par 1/30/18\par She is here for follow up. She had the MR of pelvis on 1/12/18: Status post lower anterior rectal resection, without MRI evidence of tumor recurrence. She was started on her 1st of 3 cycles of Capecitabine.  She developed nausea and diarrhea on her day 15 of capecitabine and stopped. She though she needs to take 21 days on and 7 days off. She only took 14 days. The nausea and vomiting and diarrhea resolve and may have been due to a viral illness. \par \par \par 2/21/18\par She is here for follow up. She is to start cycle 3 of 3 of capecitabine but has had a URI so has her foster daughter. She was seen by PMD reports negative flu and CXR and is now on Zpack. We decided to start the cape once she recovers.  She is complaining of hemorrhoids and wishes to see the Surgeon.

## 2018-02-21 NOTE — REVIEW OF SYSTEMS
[Fatigue] : fatigue [Fever] : no fever [Chills] : no chills [Night Sweats] : no night sweats [Recent Change In Weight] : ~T no recent weight change [Cough] : no cough [SOB on Exertion] : no shortness of breath during exertion [Abdominal Pain] : no abdominal pain [Vomiting] : no vomiting [Diarrhea] : no diarrhea [Negative] : Gastrointestinal [de-identified] : Neuropathy in feet, numbness did not complaint today

## 2018-02-21 NOTE — ASSESSMENT
[Curative] : Goals of care discussed with patient: Curative [FreeTextEntry1] : 47  year old  female with clinical T3CN1 rectosigmoid adenocarcinoma on neoadjuvant chemotherapy with FOLFOX  s/p 8 cycles  then to be followed by chemoradiation than TME. MRI showed response after cycle 4 but progression post cycle 8 surgery was not recommended t and she   completed   Chemoradiation 8/21/17.  Her CEA normalized but post treatment MR rectum showed response to tumor but a   a 0.9 cm enhancing lesion in the left iliac bone was found.  All are MRIs were reviewed and apparently that lesion has been there from the beginning but was not enhancing. It is not seen on the CAT scan. Bone scan was negative. Thus was felt to be a benign finding, and enhancement near the due to treatment effect. Her PET was negative  She than a underwent robotic assisted low anterior resection on October 23, 2017. 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 It was R0 resection. \par \par Weight loss and decondition\par -Multifactorial. Repeat PET.CT and MR pelvis was negative.\par \par Cough \par resolved.\par \par Recent Diarrhea and Nausea and vomiting due to viral illness   Resloved\par \par Now has a new URI on Zpack\par \par Plan:\par -she will start cycle 3 of 3 of adjuvant Capecitabine 1500 mg BID d1-14 every 21 days  once she feels better from the URI\par -RTC in 4 weeks \par -Surgery referral for Hemorid\par -CBC, CMP and CEA ordered \par \par

## 2018-02-21 NOTE — PHYSICAL EXAM
[Fully active, able to carry on all pre-disease performance without restriction] : Status 0 - Fully active, able to carry on all pre-disease performance without restriction [Obese] : obese [Normal] : affect appropriate [Ulcers] : no ulcers [Mucositis] : no mucositis [Thrush] : no thrush [Vesicles] : no vesicles [de-identified] : Port in place, looks well

## 2018-02-23 LAB — CEA SERPL-MCNC: 3.2 NG/ML

## 2018-03-13 ENCOUNTER — APPOINTMENT (OUTPATIENT)
Dept: SURGERY | Facility: CLINIC | Age: 48
End: 2018-03-13
Payer: MEDICAID

## 2018-03-13 VITALS
BODY MASS INDEX: 28.35 KG/M2 | HEIGHT: 63 IN | SYSTOLIC BLOOD PRESSURE: 130 MMHG | DIASTOLIC BLOOD PRESSURE: 82 MMHG | WEIGHT: 160 LBS

## 2018-03-13 DIAGNOSIS — K64.9 UNSPECIFIED HEMORRHOIDS: ICD-10-CM

## 2018-03-13 PROCEDURE — 99214 OFFICE O/P EST MOD 30 MIN: CPT | Mod: 25

## 2018-03-13 PROCEDURE — 46600 DIAGNOSTIC ANOSCOPY SPX: CPT

## 2018-03-18 RX ORDER — METRONIDAZOLE 500 MG
1 TABLET ORAL
Qty: 0 | Refills: 0 | COMMUNITY
Start: 2018-03-18 | End: 2018-03-25

## 2018-03-18 RX ORDER — MOXIFLOXACIN HYDROCHLORIDE TABLETS, 400 MG 400 MG/1
1 TABLET, FILM COATED ORAL
Qty: 0 | Refills: 0 | COMMUNITY
Start: 2018-03-18 | End: 2018-03-25

## 2018-03-19 ENCOUNTER — OUTPATIENT (OUTPATIENT)
Dept: OUTPATIENT SERVICES | Facility: HOSPITAL | Age: 48
LOS: 1 days | Discharge: HOME | End: 2018-03-19

## 2018-03-19 VITALS
TEMPERATURE: 97 F | HEIGHT: 63.5 IN | HEART RATE: 78 BPM | RESPIRATION RATE: 18 BRPM | WEIGHT: 160.06 LBS | SYSTOLIC BLOOD PRESSURE: 132 MMHG | OXYGEN SATURATION: 96 % | DIASTOLIC BLOOD PRESSURE: 72 MMHG

## 2018-03-19 DIAGNOSIS — Z01.818 ENCOUNTER FOR OTHER PREPROCEDURAL EXAMINATION: ICD-10-CM

## 2018-03-19 DIAGNOSIS — Z98.890 OTHER SPECIFIED POSTPROCEDURAL STATES: Chronic | ICD-10-CM

## 2018-03-19 NOTE — H&P PST ADULT - PSH
History of surgery  removal rectal tumor  heart ablation x2 (2010 & 2011)  tonsillectomy  cholecystectomy

## 2018-03-20 LAB
ANION GAP SERPL CALC-SCNC: 16 MMOL/L — HIGH (ref 7–14)
APTT BLD: 30.3 SEC — SIGNIFICANT CHANGE UP (ref 27–39.2)
BASOPHILS # BLD AUTO: 0.02 K/UL — SIGNIFICANT CHANGE UP (ref 0–0.2)
BASOPHILS NFR BLD AUTO: 0.4 % — SIGNIFICANT CHANGE UP (ref 0–1)
BUN SERPL-MCNC: 13 MG/DL — SIGNIFICANT CHANGE UP (ref 10–20)
CALCIUM SERPL-MCNC: 8.8 MG/DL — SIGNIFICANT CHANGE UP (ref 8.5–10.1)
CHLORIDE SERPL-SCNC: 99 MMOL/L — SIGNIFICANT CHANGE UP (ref 98–110)
CO2 SERPL-SCNC: 25 MMOL/L — SIGNIFICANT CHANGE UP (ref 17–32)
CREAT SERPL-MCNC: 0.5 MG/DL — LOW (ref 0.7–1.5)
EOSINOPHIL # BLD AUTO: 0.14 K/UL — SIGNIFICANT CHANGE UP (ref 0–0.7)
EOSINOPHIL NFR BLD AUTO: 2.6 % — SIGNIFICANT CHANGE UP (ref 0–8)
GLUCOSE SERPL-MCNC: 118 MG/DL — HIGH (ref 70–110)
HCT VFR BLD CALC: 34.8 % — LOW (ref 37–47)
HGB BLD-MCNC: 11.5 G/DL — LOW (ref 12–16)
IMM GRANULOCYTES NFR BLD AUTO: 0.2 % — SIGNIFICANT CHANGE UP (ref 0.1–0.3)
INR BLD: 1.05 RATIO — SIGNIFICANT CHANGE UP (ref 0.65–1.3)
LYMPHOCYTES # BLD AUTO: 0.54 K/UL — LOW (ref 1.2–3.4)
LYMPHOCYTES # BLD AUTO: 10.2 % — LOW (ref 20.5–51.1)
MCHC RBC-ENTMCNC: 31 PG — SIGNIFICANT CHANGE UP (ref 27–31)
MCHC RBC-ENTMCNC: 33 G/DL — SIGNIFICANT CHANGE UP (ref 32–37)
MCV RBC AUTO: 93.8 FL — SIGNIFICANT CHANGE UP (ref 81–99)
MONOCYTES # BLD AUTO: 0.31 K/UL — SIGNIFICANT CHANGE UP (ref 0.1–0.6)
MONOCYTES NFR BLD AUTO: 5.8 % — SIGNIFICANT CHANGE UP (ref 1.7–9.3)
NEUTROPHILS # BLD AUTO: 4.3 K/UL — SIGNIFICANT CHANGE UP (ref 1.4–6.5)
NEUTROPHILS NFR BLD AUTO: 80.8 % — HIGH (ref 42.2–75.2)
NRBC # BLD: 0 /100 WBCS — SIGNIFICANT CHANGE UP (ref 0–0)
PLATELET # BLD AUTO: 262 K/UL — SIGNIFICANT CHANGE UP (ref 130–400)
POTASSIUM SERPL-MCNC: 3.5 MMOL/L — SIGNIFICANT CHANGE UP (ref 3.5–5)
POTASSIUM SERPL-SCNC: 3.5 MMOL/L — SIGNIFICANT CHANGE UP (ref 3.5–5)
PROTHROM AB SERPL-ACNC: 11.4 SEC — SIGNIFICANT CHANGE UP (ref 9.95–12.87)
RBC # BLD: 3.71 M/UL — LOW (ref 4.2–5.4)
RBC # FLD: 15.1 % — HIGH (ref 11.5–14.5)
SODIUM SERPL-SCNC: 140 MMOL/L — SIGNIFICANT CHANGE UP (ref 135–146)
WBC # BLD: 5.32 K/UL — SIGNIFICANT CHANGE UP (ref 4.8–10.8)
WBC # FLD AUTO: 5.32 K/UL — SIGNIFICANT CHANGE UP (ref 4.8–10.8)

## 2018-03-21 ENCOUNTER — APPOINTMENT (OUTPATIENT)
Dept: INFUSION THERAPY | Facility: CLINIC | Age: 48
End: 2018-03-21

## 2018-03-21 ENCOUNTER — APPOINTMENT (OUTPATIENT)
Dept: HEMATOLOGY ONCOLOGY | Facility: CLINIC | Age: 48
End: 2018-03-21

## 2018-03-22 ENCOUNTER — APPOINTMENT (OUTPATIENT)
Dept: INTERNAL MEDICINE | Facility: CLINIC | Age: 48
End: 2018-03-22

## 2018-03-30 ENCOUNTER — OUTPATIENT (OUTPATIENT)
Dept: OUTPATIENT SERVICES | Facility: HOSPITAL | Age: 48
LOS: 1 days | Discharge: HOME | End: 2018-03-30

## 2018-03-30 ENCOUNTER — RESULT REVIEW (OUTPATIENT)
Age: 48
End: 2018-03-30

## 2018-03-30 VITALS
DIASTOLIC BLOOD PRESSURE: 74 MMHG | TEMPERATURE: 98 F | OXYGEN SATURATION: 100 % | HEART RATE: 17 BPM | HEIGHT: 63 IN | RESPIRATION RATE: 65 BRPM | SYSTOLIC BLOOD PRESSURE: 122 MMHG | WEIGHT: 160.06 LBS

## 2018-03-30 VITALS — DIASTOLIC BLOOD PRESSURE: 84 MMHG | SYSTOLIC BLOOD PRESSURE: 148 MMHG

## 2018-03-30 DIAGNOSIS — Z98.890 OTHER SPECIFIED POSTPROCEDURAL STATES: Chronic | ICD-10-CM

## 2018-03-30 RX ORDER — ONDANSETRON 8 MG/1
4 TABLET, FILM COATED ORAL ONCE
Qty: 0 | Refills: 0 | Status: DISCONTINUED | OUTPATIENT
Start: 2018-03-30 | End: 2018-04-14

## 2018-03-30 RX ORDER — SENNA PLUS 8.6 MG/1
1 TABLET ORAL
Qty: 14 | Refills: 0 | OUTPATIENT
Start: 2018-03-30 | End: 2018-04-05

## 2018-03-30 RX ORDER — SODIUM CHLORIDE 9 MG/ML
1000 INJECTION, SOLUTION INTRAVENOUS
Qty: 0 | Refills: 0 | Status: DISCONTINUED | OUTPATIENT
Start: 2018-03-30 | End: 2018-04-14

## 2018-03-30 RX ORDER — MEPERIDINE HYDROCHLORIDE 50 MG/ML
12.5 INJECTION INTRAMUSCULAR; INTRAVENOUS; SUBCUTANEOUS ONCE
Qty: 0 | Refills: 0 | Status: DISCONTINUED | OUTPATIENT
Start: 2018-03-30 | End: 2018-03-30

## 2018-03-30 RX ORDER — MORPHINE SULFATE 50 MG/1
2 CAPSULE, EXTENDED RELEASE ORAL
Qty: 0 | Refills: 0 | Status: DISCONTINUED | OUTPATIENT
Start: 2018-03-30 | End: 2018-03-30

## 2018-03-30 RX ORDER — DOCUSATE SODIUM 100 MG
1 CAPSULE ORAL
Qty: 14 | Refills: 0 | OUTPATIENT
Start: 2018-03-30 | End: 2018-04-05

## 2018-03-30 RX ADMIN — SODIUM CHLORIDE 100 MILLILITER(S): 9 INJECTION, SOLUTION INTRAVENOUS at 13:53

## 2018-03-30 NOTE — ASU DISCHARGE PLAN (ADULT/PEDIATRIC). - SPECIAL INSTRUCTIONS
Clots and abnormal stools the first few times after surgery  constipation can happen with pain meds. Please take stool softners prescribed.

## 2018-03-30 NOTE — ASU DISCHARGE PLAN (ADULT/PEDIATRIC). - NOTIFY
Unable to Urinate/Numbness, color, or temperature change to extremity/Pain not relieved by Medications/Bleeding that does not stop/Swelling that continues

## 2018-03-30 NOTE — ASU DISCHARGE PLAN (ADULT/PEDIATRIC). - MEDICATION SUMMARY - MEDICATIONS TO TAKE
I will START or STAY ON the medications listed below when I get home from the hospital:    oxyCODONE-acetaminophen 5 mg-325 mg oral tablet  -- 1 tab(s) by mouth 4 times a day MDD:6  -- Caution federal law prohibits the transfer of this drug to any person other  than the person for whom it was prescribed.  May cause drowsiness.  Alcohol may intensify this effect.  Use care when operating dangerous machinery.  This prescription cannot be refilled.  This product contains acetaminophen.  Do not use  with any other product containing acetaminophen to prevent possible liver damage.  Using more of this medication than prescribed may cause serious breathing problems.    -- Indication: For K64.9,01704    Colace 100 mg oral capsule  -- 1 cap(s) by mouth 2 times a day   -- Medication should be taken with plenty of water.    -- Indication: For K64.9,07990    Petrona-javid 8.6 mg oral tablet  -- 1 tab(s) by mouth 2 times a day   -- Indication: For K64.9,80909

## 2018-03-30 NOTE — CHART NOTE - NSCHARTNOTEFT_GEN_A_CORE
PACU ANESTHESIA ADMISSION NOTE      Procedure: EUA, Hemorrhoidectomy  Post op diagnosis:      ____  Intubated  TV:______       Rate: ______      FiO2: ______    _x___  Patent Airway    _x___  Full return of protective reflexes    _x___  Full recovery from anesthesia / back to baseline status    Vitals:  see anesthesia record    Mental Status:  _x___ Awake   _____ Alert   _____ Drowsy   _____ Sedated    Nausea/Vomiting:  _x___  NO       ______Yes,   See Post - Op Orders         Pain Scale (0-10):  _____    Treatment: ____ None    __x__ See Post - Op/PCA Orders    Post - Operative Fluids:   ____ Oral   ___x_ See Post - Op Orders    Plan: Discharge:   __x__Home       _____Floor     _____Critical Care    _____  Other:_________________    Comments:  No anesthesia issues or complications noted.  Discharge when criteria met.

## 2018-03-30 NOTE — ASU DISCHARGE PLAN (ADULT/PEDIATRIC). - ASU FOLLOWUP
911 or go to the nearest Emergency Room [facility]:  Ambulatory Surgery [facility]...

## 2018-04-05 LAB — SURGICAL PATHOLOGY STUDY: SIGNIFICANT CHANGE UP

## 2018-04-06 DIAGNOSIS — K64.4 RESIDUAL HEMORRHOIDAL SKIN TAGS: ICD-10-CM

## 2018-04-06 DIAGNOSIS — K64.8 OTHER HEMORRHOIDS: ICD-10-CM

## 2018-04-11 ENCOUNTER — APPOINTMENT (OUTPATIENT)
Dept: HEMATOLOGY ONCOLOGY | Facility: CLINIC | Age: 48
End: 2018-04-11

## 2018-04-11 ENCOUNTER — LABORATORY RESULT (OUTPATIENT)
Age: 48
End: 2018-04-11

## 2018-04-11 ENCOUNTER — OUTPATIENT (OUTPATIENT)
Dept: OUTPATIENT SERVICES | Facility: HOSPITAL | Age: 48
LOS: 1 days | Discharge: HOME | End: 2018-04-11

## 2018-04-11 ENCOUNTER — APPOINTMENT (OUTPATIENT)
Dept: INFUSION THERAPY | Facility: CLINIC | Age: 48
End: 2018-04-11

## 2018-04-11 VITALS
BODY MASS INDEX: 28.7 KG/M2 | SYSTOLIC BLOOD PRESSURE: 138 MMHG | WEIGHT: 162 LBS | DIASTOLIC BLOOD PRESSURE: 73 MMHG | HEART RATE: 63 BPM | HEIGHT: 63 IN | TEMPERATURE: 97.3 F | RESPIRATION RATE: 14 BRPM

## 2018-04-11 DIAGNOSIS — C20 MALIGNANT NEOPLASM OF RECTUM: ICD-10-CM

## 2018-04-11 DIAGNOSIS — Z98.890 OTHER SPECIFIED POSTPROCEDURAL STATES: Chronic | ICD-10-CM

## 2018-04-11 LAB
HCT VFR BLD CALC: 33.6 %
HGB BLD-MCNC: 11.2 G/DL
MCHC RBC-ENTMCNC: 30.4 PG
MCHC RBC-ENTMCNC: 33.3 G/DL
MCV RBC AUTO: 91.3 FL
PLATELET # BLD AUTO: 258 K/UL
PMV BLD: 8.6 FL
RBC # BLD: 3.68 M/UL
RBC # FLD: 13.2 %
WBC # FLD AUTO: 3.82 K/UL

## 2018-04-11 RX ORDER — CAPECITABINE 500 MG/1
500 TABLET ORAL
Qty: 168 | Refills: 0 | Status: COMPLETED | COMMUNITY
Start: 2017-07-10 | End: 2018-04-11

## 2018-04-11 RX ORDER — CAPECITABINE 500 MG/1
500 TABLET ORAL
Qty: 112 | Refills: 6 | Status: COMPLETED | COMMUNITY
Start: 2017-11-16 | End: 2018-04-11

## 2018-04-12 LAB
ALBUMIN SERPL ELPH-MCNC: 4 G/DL
ALP BLD-CCNC: 105 U/L
ALT SERPL-CCNC: 8 U/L
ANION GAP SERPL CALC-SCNC: 14 MMOL/L
AST SERPL-CCNC: 18 U/L
BILIRUB SERPL-MCNC: 0.3 MG/DL
BUN SERPL-MCNC: 14 MG/DL
CALCIUM SERPL-MCNC: 9.7 MG/DL
CHLORIDE SERPL-SCNC: 102 MMOL/L
CO2 SERPL-SCNC: 26 MMOL/L
CREAT SERPL-MCNC: 0.5 MG/DL
GLUCOSE SERPL-MCNC: 90 MG/DL
POTASSIUM SERPL-SCNC: 4.1 MMOL/L
PROT SERPL-MCNC: 6.8 G/DL
SODIUM SERPL-SCNC: 142 MMOL/L

## 2018-04-13 NOTE — RESULTS/DATA
[FreeTextEntry1] : Check 5 on Selected Components for PET CT WHOLE BODY SKULL BASE TO MID- \par     TEST: PET CT WHOLE BODY SKULL BASE TO MID-\par  \par     Collected Date & Time: 2017 13:36\par  \par        \par     Result Name Results Units Reference Range \par      PET CT WHOLE BODY SKULL BASE TO MID-   SEE TEXT       \par     Patient Name: IVANNA NIEVES : 1970\par Patient ID: [id number]\par Account: [id number]\par Patient Location: [facility]\par Accession: [id number]\par Procedure: PET CT WHOLE BODY SKULL BASE TO MID-THIGH SUBSEQUENT [id number]\par Date of Exam: 2017 01:36 PM\par Attending Physician: GIULIANA REBOLLEDO\par Requesting Physician: GIULIANA REBOLLEDO MD\par ivanna nieves\par FDG PET CT STUDY, SUBSEQUENT TREATMENT STRATEGY\par REASON: TUMOR IMAGING - PET with concurrently acquired CT for attenuation\par correction and anatomic localization; skull base to mid - thigh /\par 69512/colorectal carcinoma, subsequent treatment strategy\par ICD10M code:C 21;C 18.9\par HISTORY: This a  47 -year-old patient with rectosigmoid adenocarcinoma\par diagnosed 2017 status post chemoradiation therapy and underwent \par robotic\par assisted low anterior resection 10/23/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 \par T3\par N1b-R0 resection\par FDG PET CT study 10/13/2017 compared  showed no new definite sites of\par pathologic FDG uptake; anatomically decreased size of FDG avid mid to high\par rectal mass approximately 4 cm in length with SUV 16.9 vs 25.8; focal mildly\par increased uptake in 0.6 cm subcutaneous lesion on the left back (SUV 3) likely\par infectious or inflammatory; interval resolution of previously described FDG \par avid\par perirectal lymph node; no abnormal radiotracer uptake at the site of left \par iliac\par focus of increased enhancement on MRI (negative bone scan October 10, 2017)\par CT of abdomen and pelvis 2017 showed presacral fluid and air which\par may be postoperative change\par Last chemotherapy and radiation therapy 2017\par  Blood glucose pre injection 108 mg/dL\par TECHNIQUE: Approximately 45 minutes after the intravenous administration of\par 12.5 mCi 18-Fluorine FDG, whole body PET images were acquired from base of \par skull\par to mid - thigh. In addition, non-contrast, low dose, non - diagnostic CT was\par acquired for attenuation correction and anatomic correlation purposes only.\par These images reveal compared to 10/13/2017, a new finding of abnormal FDG\par uptake (SUV 5.6) in the presacral region coregistering with air collection \par with\par peripheral fluid/phlegmonous change on review with body imaging. The sacral \par air\par and fluid collection has improved compared to 2017..\par Previous FDG avid rectal mass has been surgically resected.\par Previous focus of uptake in the left back subcutaneous lesion is no longer\par present and probably represented inflammatory uptake at that time\par No other sites of abnormal FDG uptake. The site of MRI abnormality in the left\par iliac bone is again non-FDG avid\par IMPRESSION:\par Compared to 10/13/2017, a new finding of abnormal FDG uptake (SUV 5.6) in the\par presacral region coregistering with air collection with peripheral fluid and\par edema - (on CT, the presacral air collection and edema has improved compared \par to\par 10/31/2017) ..\par Previous FDG avid rectal mass has been surgically resected.\par The site of MRI abnormality in the left iliac bone is again non-FDG avid\par No other sites of abnormal FDG uptake\par RIOS BAUTISTA D.O., M.M.M, C.P.E.\par Spoke with GIULIANA REBOLLEDO MD on 2017 3:28 PM with readback. We\par discussed the fact that it is unclear whether this represents postoperative\par change, less likely inflammatory uptake versus tumor recurrence. The patient \par is\par already being scheduled for repeat MRI, it may be that the MRI will help to\par differentiate between recurrence and post operative change\par I the attending attest that I have personally reviewed these images and agree\par with this report.\par Original final report dictated and signed by Dr. Rios Bautista and Dr. vickey Velazquez\par [place] on 2017 03:33 PM\par  \par

## 2018-04-13 NOTE — ASSESSMENT
[FreeTextEntry1] : 48  year old  female with clinical T3CN1 rectosigmoid adenocarcinoma on neoadjuvant chemotherapy with FOLFOX  s/p 8 cycles  then to be followed by chemoradiation than TME. MRI showed response after cycle 4 but progression post cycle 8 surgery was not recommended t and she   completed   Chemoradiation 8/21/17.  Her CEA normalized but post treatment MR rectum showed response to tumor but a   a 0.9 cm enhancing lesion in the left iliac bone was found.  All are MRIs were reviewed and apparently that lesion has been there from the beginning but was not enhancing. It is not seen on the CAT scan. Bone scan was negative. Thus was felt to be a benign finding, and enhancement near the due to treatment effect. Her PET was negative  She than a underwent robotic assisted low anterior resection on October 23, 2017. 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 It was R0 resection.  She completed 3 cycles of Capecitabine on 3/2018\par \par Weight loss and decondition\par -Multifactorial. Repeat PET.CT and MR pelvis was negative.\par \par Cough \par resolved.\par \par Recent Diarrhea and Nausea and vomiting due to viral illness   Resolved\par \par Plan:\par -cbc, CMP and CEA were fine form visit\par -she will see me in 3 months\par -repeat imaging around 6/2018 and Repeat Colonoscopy around  10/2018\par \par  [Curative] : Goals of care discussed with patient: Curative

## 2018-04-13 NOTE — PHYSICAL EXAM
[Fully active, able to carry on all pre-disease performance without restriction] : Status 0 - Fully active, able to carry on all pre-disease performance without restriction [Obese] : obese [Ulcers] : no ulcers [Mucositis] : no mucositis [Thrush] : no thrush [Vesicles] : no vesicles [Normal] : affect appropriate [de-identified] : Port in place, looks fine

## 2018-04-13 NOTE — HISTORY OF PRESENT ILLNESS
[de-identified] : 47 year old female, who was having watery diarrhea for couple of months associated with some blood Per rectum on wiping. No naren blood. denies abdominal pain or rectal pain. Associated with weight loss of about 20 lbs over 6 months.\par Following which she had elective colonoscopy and EGD in 9th December 2016, EGD showed normal esophagus and duodenum with erythematous mucosa in the antrum, the pathology of which was chronic gastritis.\par \par Colonoscopy (12/9/2016): External grade 3 hemorrhoids with polyp in the sigmoid colon, 15 cm from the entry site, polypectomy was not done and another sessile polyp in the ascending colon, which was removed by snare polypectomy. 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 \par On 01/16/17, she underwent colonoscopy and was found to have 3 cm frond like/villous, ulcerated and friable mass lesion in the sigmoid colon at 20 cm from entry site, which was oozing blood. Polypectomy was amenable and biopsy was taken. Multiple biopsies were taken. It was suspicious for adenocarcinoma. \par \par Pathology: Fragments of adenocarcinoma, at least intramucosal with foci highly suspicious of invasion. No mismatch repair present.\par \par 01/20/17: Underwent CT abdomen/pelvis: Rectosigmoid  mass,  measuring  5.5  cm  CC,  consistent  with  index  lesion,  with  lower  tumor  margin  approximately  8  cm  from  anal  verge,  within  mid-rectum.  \par   Perisigmoid  partially  necrotic  1.9  x  1.7  cm  lymph  node. 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  Right  adrenal  2  cm  nodule,  indeterminate  on  postcontrast  CT,  however  likely  represents  adenoma;\par \par She was then evaluated by surgical oncology Dr Syed, who after seeing the imaging studies, thought the lesion is more rectal and thought the patient would benefit from receiving neoadjuvant chemoRT and provided there is a good response, can achieve R0 resection.\par MRI Rectum was ordered, which is due on 01/27 and PET CT 01/30.  \par \par The patient is here to discuss her options of chemotherapy, chemoRT and further plan of care.\par \par At this time today, patient does have watery diarrhea and blood NC on wiping. Denies abdominal pain. Weight loss of about 20 lbs in past 6 months. on and off vomiting, denies nausea or fever, chills or rigors. [de-identified] : Recto sigmoid adenocarcinoma [de-identified] : CEA 3.4, CA 19-9: 66.9,  : 17 [de-identified] : CBC: 12.8, platelets: 428, wbc: 6.5\par LFT's: wnl\par Craetinine: 0.65\par Chest x ray: no evidence of acute pulmonary disease. [Therapy: ___] : Therapy: [unfilled] [Cycle: ___] : Cycle: [unfilled] [Day: ___] : Day: [unfilled] [FreeTextEntry1] : 5FU bolus was stopped due to neutropenia.  She completed 8 cycles of Neoadjuvant FOLFOX and Comoleted Chemoradiation on 8/21/17. She started Adjuvant Capecitabine on January 2018.\par Completed 3/3 adjuvant capecitabine cycles  around 3/2018 [de-identified] : March 22nd 2017\par Patient returns for followup today. Since her last visit she underwent an MRI Of the rectum on 1/27/2017 that showed Polypoid rectal mass which straddles the anterior peritoneal reflection  with associated perirectal adenopathy. STAGE:  T3c N1 Mx. Necrotic perirectal lymph node noted on reference CT corresponds to a 1.9 x\par 1.7 cm left adnexal cyst on MRI.\par She also underwent a PET/CT scan In February 2, 2017 and demonstrated Intense pathologic FDG uptake (SUV 25.8 ) coregistering with rectosigmoid mass\par consistent with documented biologic tumor activity. One FDG avid (SUV 8.1) 1.1 x 1.3 cm perirectal lymph node . No other sites of abnormal FDG uptake.\par \par She was presented at our tumor board. Another discussion and review of data we decided to proceed with  neoadjuvant chemotherapy with FOLFOX for 4 months with every 2 months assessments of MRI this is to follow with chemoradiation and than TME.\par A port has been placed. She started FOLFOX and currently presents Prior to cycle 3. Her length of medication so far has been mild intermittent neuropathy and neutropenia and required delaying chemotherapy as well as discontinuation of 5-FU bolus and 3 days of Neupogen.\par \par She has no other complaints.\par \par 4/19/17\par She is doing well except for some nausea for which  Compazine helps. She had an MRI of Rectum after 4 cycles  that showed  4/10/17:  decreased size of the polypoid rectal mass, now predominantly above the peritoneal reflection measuring 3.6 cm in length,\par previously 4.8 cm.\par \par 5/17/17 \par She presents for follow up today. She is doing well. She does have fatigue, no neuropathy. She is loosing her job and disibility and bills may be a problem she is in contact with our social workers.\par \par 6/7/17\par She presents for follow up. Only complaint is fatuge. Her last cycle was delayde due to neutropenia. We dropped the dose of 5FU and Oxoli by 20% and added neupogen for 3 days.  Counts from today still show mild neutropenia. \par \par 7/10/17\par She presents for follow up today. She had MRI rectum that unfortunately showed increase in her tumor. (6/29/17: .  Since April 10, 2017, increased size of polypoid rectal mass, which now straddles the peritoneal reflection measuring 5.6 (previously 3.6cm) and increased perirectal adenopathy; desmoplastic reaction limits evaluation for\par primary tumor staging.\par  She was seen by Dr. Macias and he she was recommended to start chemoradiation. She is Doing well otherwise blood is having a hard time passing stool I recommended she continues with laxatives.\par \par 7/31/17\par Patient comes in for follow up visit, she is on day 12 of 28 of chemoradiation, patient has been having burning and itching in the vaginal and perineal areas patient tried monistat with no relief, she then tried hydrocortisone which worsened the burning. The patient also states that she has been having diarrhea, 12 episodes a day, patient has not been having good intake and is not drinking a lot of water. The patient also endorses nausea and vomiting, she states that the zofran does not help her and makes her more nauseous and the compazine makes her sleepy. Patient also states she is weaker and more tired. Her counts have slowly been trending down due to radiation. \par \par 8/7/17\par Patient comes in today for follow up, feels weak and tired, states that she is still having diarrhea but it has improved. She was prescribed immodium on the last visit which she took, but it made her constipated and when she stopped taking it, there was severe diarrhea. The patient also has nausea with some mild vomiting and dry heaves. The patient also states that she has no appetite and is not eating much. The patient continues to have burning and pruritis in the perineal and vaginal areas and was prescribed aquaphor cream by radiation with little relief. \par \par 8/14/17\par Still has 8-12 bowel movements and describes it as diarrhea, some are formed but mainly liquid but not so much.  She stopped Imodium since it made her not go at all. Still has 6 days of chemoradiation. Dizzy rarely. Drinks 16-32 oz of gatorade. Ucx was normal.\par \par 8/31/17\par She feels much better now. Skin feels better and is eating. \par \par \par 10/11/17\par She is her for follow up. She had a MR of rectum 10/2/17 that showed  Since June 29, 2017, decreased size polypoid rectal mass now measuring 4.5\par cm (previously 5.6 cm), with overall unchanged perirectal adenopathy;\par desmoplastic reaction limits evaluation for primary tumor staging.   New 0.9 cm enhancing lesion in the left iliac bone, suspicious for osseous\par metastasis; further evaluation with nuclear medicine bone scan may be of use.\par I reviewed the images with Dr. Clark and the bone finding on MR has been there on all the previous MR and have not changed in size but did enhance more. He though it may have been even a cyst. He also recommended a bone scan  to make sure. She had a Bone scan on 10/10/17 No evidence for metastatic bone disease. Specifically, no abnormal radiotracer uptake within left iliac bone.\par \par She feels well otherwise and she scheduled to undergo surgery  on October 23. Her PET/CT scan is still pending.\par \par \par 11/16/17\par She is here for follow up. She underwent  PET CT scan on 10/13/17 that did not show any distant mets. She than a underwent robotic assisted low anterior resection on October 23, 2017. 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 It was R0 resection. \par \par She was presented in our tumor board and further adjuvant chemotherapy was recommended. She saw Dr. Collins who recommended capecitabine 1250 mg/m2 bid\par \par She recovered well from surgery. She has numnbess in her feet from previous oxalipaltin. Gabapentin  is not helping.\par \par 12/4/17\par She is here for follow up.  She was seen in [facility] and they recommneded capecitabine 1500 mg BID 2 weeks on and 1 week off for 3 cycles. Given her weight loss and her bowel movements not normalizing they recommended to hold capecitabine for now, have her see PMD, see physical therapy and to have restaging PET/CT and MR Pelvis prior to restarting adjuvant chemotherapy.\par \par She does feel depressed but that is because she cant pay her bills. She did not want to see pshychiatrist. She also has a cough, dry for 4 weeks but did have a URI about 4 weeks ago. No other complaints.\par \par 1/8/18\par She is here for follow up. She had a PET CT 12/19/18 that most likely showed post surgical changes and an MR is pending next week for conformation. Emotionally she is better. She has gained weight. \par \par 1/30/18\par She is here for follow up. She had the MR of pelvis on 1/12/18: Status post lower anterior rectal resection, without MRI evidence of tumor recurrence. She was started on her 1st of 3 cycles of Capecitabine.  She developed nausea and diarrhea on her day 15 of capecitabine and stopped. She though she needs to take 21 days on and 7 days off. She only took 14 days. The nausea and vomiting and diarrhea resolve and may have been due to a viral illness. \par \par \par 2/21/18\par She is here for follow up. She is to start cycle 3 of 3 of capecitabine but has had a URI so has her foster daughter. She was seen by PMD reports negative flu and CXR and is now on Zpack. We decided to start the cape once she recovers.  She is complaining of hemorrhoids and wishes to see the Surgeon.\par \par 4/11/18\par She is doing well. Recently had a hemeroidectomy by Dr. Lopez and recovered well. She has no complaints. She completed her adjuvant cape on 3/2018

## 2018-04-13 NOTE — REVIEW OF SYSTEMS
[Fever] : no fever [Chills] : no chills [Night Sweats] : no night sweats [Fatigue] : no fatigue [Recent Change In Weight] : ~T no recent weight change [Cough] : no cough [SOB on Exertion] : no shortness of breath during exertion [Abdominal Pain] : no abdominal pain [Vomiting] : no vomiting [Diarrhea] : no diarrhea [Negative] : Heme/Lymph [de-identified] : Neuropathy in feet, numbness did not complaint today

## 2018-04-15 LAB — CEA SERPL-MCNC: 2.1 NG/ML

## 2018-04-23 ENCOUNTER — APPOINTMENT (OUTPATIENT)
Dept: INTERNAL MEDICINE | Facility: CLINIC | Age: 48
End: 2018-04-23

## 2018-04-24 ENCOUNTER — APPOINTMENT (OUTPATIENT)
Dept: SURGERY | Facility: CLINIC | Age: 48
End: 2018-04-24

## 2018-05-22 ENCOUNTER — APPOINTMENT (OUTPATIENT)
Dept: INFUSION THERAPY | Facility: CLINIC | Age: 48
End: 2018-05-22

## 2018-05-24 ENCOUNTER — OUTPATIENT (OUTPATIENT)
Dept: OUTPATIENT SERVICES | Facility: HOSPITAL | Age: 48
LOS: 1 days | Discharge: HOME | End: 2018-05-24

## 2018-05-24 ENCOUNTER — APPOINTMENT (OUTPATIENT)
Dept: INTERNAL MEDICINE | Facility: CLINIC | Age: 48
End: 2018-05-24

## 2018-05-24 VITALS
DIASTOLIC BLOOD PRESSURE: 70 MMHG | HEIGHT: 63 IN | BODY MASS INDEX: 28.35 KG/M2 | HEART RATE: 64 BPM | SYSTOLIC BLOOD PRESSURE: 135 MMHG | WEIGHT: 160 LBS

## 2018-05-24 DIAGNOSIS — Z98.890 OTHER SPECIFIED POSTPROCEDURAL STATES: Chronic | ICD-10-CM

## 2018-05-24 DIAGNOSIS — C20 MALIGNANT NEOPLASM OF RECTUM: ICD-10-CM

## 2018-05-24 NOTE — HISTORY OF PRESENT ILLNESS
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 patient is to be started on Xeloda by Dr Darden in January 2018.\par

## 2018-05-24 NOTE — PLAN
[FreeTextEntry1] : Rectal adenocarcinoma (154.1) (C20)\par Encounter for preventive health examination (V70.0) (Z00.00)\par History of supraventricular tachycardia (V12.59) (Z86.79)\par Cough, persistent (786.2) (R05)\par \par - colon cancer\par s/p chemo and surgery\par following with onco dr parada\par patient advised to see a psych but she doesn’t want a referral, she looks depressed but no suicidal ideation.\par \par \par -pre diabetes\par \par \par -HCM\par refused flu shot.

## 2018-06-24 ENCOUNTER — LABORATORY RESULT (OUTPATIENT)
Age: 48
End: 2018-06-24

## 2018-06-26 ENCOUNTER — TRANSCRIPTION ENCOUNTER (OUTPATIENT)
Age: 48
End: 2018-06-26

## 2018-06-29 ENCOUNTER — APPOINTMENT (OUTPATIENT)
Dept: INTERNAL MEDICINE | Facility: CLINIC | Age: 48
End: 2018-06-29

## 2018-06-29 ENCOUNTER — OUTPATIENT (OUTPATIENT)
Dept: OUTPATIENT SERVICES | Facility: HOSPITAL | Age: 48
LOS: 1 days | Discharge: HOME | End: 2018-06-29

## 2018-06-29 VITALS
SYSTOLIC BLOOD PRESSURE: 127 MMHG | BODY MASS INDEX: 28.94 KG/M2 | WEIGHT: 163.31 LBS | TEMPERATURE: 98.2 F | HEIGHT: 63 IN | HEART RATE: 65 BPM | DIASTOLIC BLOOD PRESSURE: 73 MMHG

## 2018-06-29 DIAGNOSIS — R73.03 PREDIABETES.: ICD-10-CM

## 2018-06-29 DIAGNOSIS — R05 COUGH: ICD-10-CM

## 2018-06-29 DIAGNOSIS — Z98.890 OTHER SPECIFIED POSTPROCEDURAL STATES: Chronic | ICD-10-CM

## 2018-06-29 DIAGNOSIS — M79.605 PAIN IN LEFT LEG: ICD-10-CM

## 2018-06-29 DIAGNOSIS — Z87.19 PERSONAL HISTORY OF OTHER DISEASES OF THE DIGESTIVE SYSTEM: ICD-10-CM

## 2018-06-29 DIAGNOSIS — Z87.898 PERSONAL HISTORY OF OTHER SPECIFIED CONDITIONS: ICD-10-CM

## 2018-06-29 DIAGNOSIS — G62.9 POLYNEUROPATHY, UNSPECIFIED: ICD-10-CM

## 2018-06-29 LAB — CYTOLOGY CVX/VAG DOC THIN PREP: NORMAL

## 2018-06-29 NOTE — HISTORY OF PRESENT ILLNESS
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 Patient presents to clinic for dizziness. She states for the past week she felt as if she was going to pass out. She has had 3 isolated episodes which she states last for about half an hour. The symptoms bothered Her so much that she presented to urgent care where EKG showed sinus patricia, CBC, BMP, UA WNL. Her daughter has a recent viral URI. She admits to a non productive cough this morning. Denies vomiting, fever, chills, abdominal pain, dysuria, night sweats. She has lost 40lbs over the last year. \par

## 2018-06-29 NOTE — REVIEW OF SYSTEMS
[Fatigue] : fatigue [Recent Change In Weight] : ~T recent weight change [Cough] : cough [Negative] : Psychiatric [Fever] : no fever [Chills] : no chills [Hot Flashes] : no hot flashes [Night Sweats] : no night sweats [Shortness Of Breath] : no shortness of breath [Wheezing] : no wheezing [Dyspnea on Exertion] : no dyspnea on exertion

## 2018-06-29 NOTE — PHYSICAL EXAM
[No Acute Distress] : no acute distress [Normal Outer Ear/Nose] : the outer ears and nose were normal in appearance [Normal Rate] : normal rate  [Soft] : abdomen soft [Non Tender] : non-tender [Non-distended] : non-distended [No Joint Swelling] : no joint swelling [Grossly Normal Strength/Tone] : grossly normal strength/tone [No Rash] : no rash [Normal Gait] : normal gait [Speech Grossly Normal] : speech grossly normal [Normal Mood] : the mood was normal [Supple] : supple [Clear to Auscultation] : lungs were clear to auscultation bilaterally [Normal S1, S2] : normal S1 and S2 [No Murmur] : no murmur heard [No Edema] : there was no peripheral edema [No CVA Tenderness] : no CVA  tenderness

## 2018-06-29 NOTE — ASSESSMENT
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 Patient presents to clinic for dizziness. \par \par 1) Dizziness \par -CBC, BMP , TSH,  UA, Glucose, WNL \par - Check carotid duplex,start meclizine,ENT consult\par 2) H/o of recto-sigmoid cancer\par -stable\par -s/p chemo and surgery\par -following with onco Dr Darden on tuesday \par .\par 3)pre diabetes\par -Last HBA1C 5.7 18 \par -no indication for treatment \par -Diet and exercise management \par \par 4)HLD\par -Last lipid panel 18 T  Chol: 241  LDL: 80 HDL:40 \par -ASCVD score 3.3%, no indication for statin therapy \par \par \par 3) HCM\par -Send for Mammogram \par -Patient had colonoscopy in  where she was found to have rectal carcinoma, she is scheduled for repeat in 2018 \par -Last pap smear 3 years ago and normal as per patient,nneds to repeat in 2018 y. \par

## 2018-07-03 ENCOUNTER — APPOINTMENT (OUTPATIENT)
Dept: INFUSION THERAPY | Facility: CLINIC | Age: 48
End: 2018-07-03

## 2018-07-03 ENCOUNTER — APPOINTMENT (OUTPATIENT)
Dept: HEMATOLOGY ONCOLOGY | Facility: CLINIC | Age: 48
End: 2018-07-03

## 2018-07-03 VITALS
HEIGHT: 63 IN | RESPIRATION RATE: 14 BRPM | WEIGHT: 163 LBS | HEART RATE: 78 BPM | DIASTOLIC BLOOD PRESSURE: 90 MMHG | BODY MASS INDEX: 28.88 KG/M2 | TEMPERATURE: 96.7 F | SYSTOLIC BLOOD PRESSURE: 135 MMHG

## 2018-07-03 DIAGNOSIS — R42 DIZZINESS AND GIDDINESS: ICD-10-CM

## 2018-07-03 DIAGNOSIS — Z85.048 PERSONAL HISTORY OF OTHER MALIGNANT NEOPLASM OF RECTUM, RECTOSIGMOID JUNCTION, AND ANUS: ICD-10-CM

## 2018-07-03 NOTE — PHYSICAL EXAM
[Fully active, able to carry on all pre-disease performance without restriction] : Status 0 - Fully active, able to carry on all pre-disease performance without restriction [Obese] : obese [Normal] : affect appropriate [Mucositis] : no mucositis [Thrush] : no thrush [Vesicles] : no vesicles [de-identified] : Port in place, looks fine on right

## 2018-07-03 NOTE — RESULTS/DATA
[FreeTextEntry1] : Check 5 on Selected Components for PET CT WHOLE BODY SKULL BASE TO MID- \par     TEST: PET CT WHOLE BODY SKULL BASE TO MID-\par  \par     Collected Date & Time: 2017 13:36\par  \par        \par     Result Name Results Units Reference Range \par      PET CT WHOLE BODY SKULL BASE TO MID-   SEE TEXT       \par     Patient Name: IVANNA NIEVES : 1970\par Patient ID: [id number]\par Account: [id number]\par Patient Location: [facility]\par Accession: [id number]\par Procedure: PET CT WHOLE BODY SKULL BASE TO MID-THIGH SUBSEQUENT [id number]\par Date of Exam: 2017 01:36 PM\par Attending Physician: GIULIANA REBOLLEDO\par Requesting Physician: GIULIANA REBOLLEDO MD\par ivanna nieves\par FDG PET CT STUDY, SUBSEQUENT TREATMENT STRATEGY\par REASON: TUMOR IMAGING - PET with concurrently acquired CT for attenuation\par correction and anatomic localization; skull base to mid - thigh /\par 04003/colorectal carcinoma, subsequent treatment strategy\par ICD10M code:C 21;C 18.9\par HISTORY: This a  47 -year-old patient with rectosigmoid adenocarcinoma\par diagnosed 2017 status post chemoradiation therapy and underwent \par robotic\par assisted low anterior resection 10/23/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 \par T3\par N1b-R0 resection\par FDG PET CT study 10/13/2017 compared  showed no new definite sites of\par pathologic FDG uptake; anatomically decreased size of FDG avid mid to high\par rectal mass approximately 4 cm in length with SUV 16.9 vs 25.8; focal mildly\par increased uptake in 0.6 cm subcutaneous lesion on the left back (SUV 3) likely\par infectious or inflammatory; interval resolution of previously described FDG \par avid\par perirectal lymph node; no abnormal radiotracer uptake at the site of left \par iliac\par focus of increased enhancement on MRI (negative bone scan October 10, 2017)\par CT of abdomen and pelvis 2017 showed presacral fluid and air which\par may be postoperative change\par Last chemotherapy and radiation therapy 2017\par  Blood glucose pre injection 108 mg/dL\par TECHNIQUE: Approximately 45 minutes after the intravenous administration of\par 12.5 mCi 18-Fluorine FDG, whole body PET images were acquired from base of \par skull\par to mid - thigh. In addition, non-contrast, low dose, non - diagnostic CT was\par acquired for attenuation correction and anatomic correlation purposes only.\par These images reveal compared to 10/13/2017, a new finding of abnormal FDG\par uptake (SUV 5.6) in the presacral region coregistering with air collection \par with\par peripheral fluid/phlegmonous change on review with body imaging. The sacral \par air\par and fluid collection has improved compared to 2017..\par Previous FDG avid rectal mass has been surgically resected.\par Previous focus of uptake in the left back subcutaneous lesion is no longer\par present and probably represented inflammatory uptake at that time\par No other sites of abnormal FDG uptake. The site of MRI abnormality in the left\par iliac bone is again non-FDG avid\par IMPRESSION:\par Compared to 10/13/2017, a new finding of abnormal FDG uptake (SUV 5.6) in the\par presacral region coregistering with air collection with peripheral fluid and\par edema - (on CT, the presacral air collection and edema has improved compared \par to\par 10/31/2017) ..\par Previous FDG avid rectal mass has been surgically resected.\par The site of MRI abnormality in the left iliac bone is again non-FDG avid\par No other sites of abnormal FDG uptake\par RIOS BAUTISTA D.O., M.M.M, C.P.E.\par Spoke with GIULIANA REBOLLEDO MD on 2017 3:28 PM with readback. We\par discussed the fact that it is unclear whether this represents postoperative\par change, less likely inflammatory uptake versus tumor recurrence. The patient \par is\par already being scheduled for repeat MRI, it may be that the MRI will help to\par differentiate between recurrence and post operative change\par I the attending attest that I have personally reviewed these images and agree\par with this report.\par Original final report dictated and signed by Dr. Rios Bautista and Dr. vickey Velazquez\par [place] on 2017 03:33 PM\par  \par

## 2018-07-03 NOTE — ASSESSMENT
[Curative] : Goals of care discussed with patient: Curative [FreeTextEntry1] : 48  year old  female with clinical T3CN1 rectosigmoid adenocarcinoma on neoadjuvant chemotherapy with FOLFOX  s/p 8 cycles  then to be followed by chemoradiation than TME. MRI showed response after cycle 4 but progression post cycle 8 surgery was not recommended t and she   completed   Chemoradiation 8/21/17.  Her CEA normalized but post treatment MR rectum showed response to tumor but a   a 0.9 cm enhancing lesion in the left iliac bone was found.  All are MRIs were reviewed and apparently that lesion has been there from the beginning but was not enhancing. It is not seen on the CAT scan. Bone scan was negative. Thus was felt to be a benign finding, and enhancement near the due to treatment effect. Her PET was negative  She than a underwent robotic assisted low anterior resection on October 23, 2017. 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 It was R0 resection.  She completed 3 cycles of Capecitabine on 3/2018\par \par Weight loss and decondition\par -Multifactorial. This  resolved \par \par Cough \par resolved.\par \par Recent Diarrhea and Nausea and vomiting due to viral illness   Resolved\par \par Lightheadedness, work up by PMD in progress, recommended hydration \par \par Plan:\par -Will check CEA  and restaging CT C/A/P if [facility] will remove port\par -she will see me in 3 -4 months\par - Repeat Colonoscopy around  10/2018 she will see me after it\par -will call with results \par \par

## 2018-07-03 NOTE — REVIEW OF SYSTEMS
[Negative] : Heme/Lymph [Fever] : no fever [Chills] : no chills [Night Sweats] : no night sweats [Fatigue] : no fatigue [Recent Change In Weight] : ~T no recent weight change [Cough] : no cough [SOB on Exertion] : no shortness of breath during exertion [Abdominal Pain] : no abdominal pain [Vomiting] : no vomiting [Diarrhea] : no diarrhea [de-identified] : Neuropathy in feet, numbness did not complaint today

## 2018-07-03 NOTE — HISTORY OF PRESENT ILLNESS
[Therapy: ___] : Therapy: [unfilled] [Cycle: ___] : Cycle: [unfilled] [Day: ___] : Day: [unfilled] [de-identified] : 47 year old female, who was having watery diarrhea for couple of months associated with some blood Per rectum on wiping. No naren blood. denies abdominal pain or rectal pain. Associated with weight loss of about 20 lbs over 6 months.\par Following which she had elective colonoscopy and EGD in 9th December 2016, EGD showed normal esophagus and duodenum with erythematous mucosa in the antrum, the pathology of which was chronic gastritis.\par \par Colonoscopy (12/9/2016): External grade 3 hemorrhoids with polyp in the sigmoid colon, 15 cm from the entry site, polypectomy was not done and another sessile polyp in the ascending colon, which was removed by snare polypectomy. 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 \par On 01/16/17, she underwent colonoscopy and was found to have 3 cm frond like/villous, ulcerated and friable mass lesion in the sigmoid colon at 20 cm from entry site, which was oozing blood. Polypectomy was amenable and biopsy was taken. Multiple biopsies were taken. It was suspicious for adenocarcinoma. \par \par Pathology: Fragments of adenocarcinoma, at least intramucosal with foci highly suspicious of invasion. No mismatch repair present.\par \par 01/20/17: Underwent CT abdomen/pelvis: Rectosigmoid  mass,  measuring  5.5  cm  CC,  consistent  with  index  lesion,  with  lower  tumor  margin  approximately  8  cm  from  anal  verge,  within  mid-rectum.  \par   Perisigmoid  partially  necrotic  1.9  x  1.7  cm  lymph  node. 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  Right  adrenal  2  cm  nodule,  indeterminate  on  postcontrast  CT,  however  likely  represents  adenoma;\par \par She was then evaluated by surgical oncology Dr Syed, who after seeing the imaging studies, thought the lesion is more rectal and thought the patient would benefit from receiving neoadjuvant chemoRT and provided there is a good response, can achieve R0 resection.\par MRI Rectum was ordered, which is due on 01/27 and PET CT 01/30.  \par \par The patient is here to discuss her options of chemotherapy, chemoRT and further plan of care.\par \par At this time today, patient does have watery diarrhea and blood CA on wiping. Denies abdominal pain. Weight loss of about 20 lbs in past 6 months. on and off vomiting, denies nausea or fever, chills or rigors. [de-identified] : Recto sigmoid adenocarcinoma [de-identified] : CEA 3.4, CA 19-9: 66.9,  : 17 [de-identified] : CBC: 12.8, platelets: 428, wbc: 6.5\par LFT's: wnl\par Craetinine: 0.65\par Chest x ray: no evidence of acute pulmonary disease. [FreeTextEntry1] : 5FU bolus was stopped due to neutropenia.  She completed 8 cycles of Neoadjuvant FOLFOX and Comoleted Chemoradiation on 8/21/17. She started Adjuvant Capecitabine on January 2018.\par Completed 3/3 adjuvant capecitabine cycles  around 3/2018 [de-identified] : March 22nd 2017\par Patient returns for followup today. Since her last visit she underwent an MRI Of the rectum on 1/27/2017 that showed Polypoid rectal mass which straddles the anterior peritoneal reflection  with associated perirectal adenopathy. STAGE:  T3c N1 Mx. Necrotic perirectal lymph node noted on reference CT corresponds to a 1.9 x\par 1.7 cm left adnexal cyst on MRI.\par She also underwent a PET/CT scan In February 2, 2017 and demonstrated Intense pathologic FDG uptake (SUV 25.8 ) coregistering with rectosigmoid mass\par consistent with documented biologic tumor activity. One FDG avid (SUV 8.1) 1.1 x 1.3 cm perirectal lymph node . No other sites of abnormal FDG uptake.\par \par She was presented at our tumor board. Another discussion and review of data we decided to proceed with  neoadjuvant chemotherapy with FOLFOX for 4 months with every 2 months assessments of MRI this is to follow with chemoradiation and than TME.\par A port has been placed. She started FOLFOX and currently presents Prior to cycle 3. Her length of medication so far has been mild intermittent neuropathy and neutropenia and required delaying chemotherapy as well as discontinuation of 5-FU bolus and 3 days of Neupogen.\par \par She has no other complaints.\par \par 4/19/17\par She is doing well except for some nausea for which  Compazine helps. She had an MRI of Rectum after 4 cycles  that showed  4/10/17:  decreased size of the polypoid rectal mass, now predominantly above the peritoneal reflection measuring 3.6 cm in length,\par previously 4.8 cm.\par \par 5/17/17 \par She presents for follow up today. She is doing well. She does have fatigue, no neuropathy. She is loosing her job and disibility and bills may be a problem she is in contact with our social workers.\par \par 6/7/17\par She presents for follow up. Only complaint is fatuge. Her last cycle was delayde due to neutropenia. We dropped the dose of 5FU and Oxoli by 20% and added neupogen for 3 days.  Counts from today still show mild neutropenia. \par \par 7/10/17\par She presents for follow up today. She had MRI rectum that unfortunately showed increase in her tumor. (6/29/17: .  Since April 10, 2017, increased size of polypoid rectal mass, which now straddles the peritoneal reflection measuring 5.6 (previously 3.6cm) and increased perirectal adenopathy; desmoplastic reaction limits evaluation for\par primary tumor staging.\par  She was seen by Dr. Macias and he she was recommended to start chemoradiation. She is Doing well otherwise blood is having a hard time passing stool I recommended she continues with laxatives.\par \par 7/31/17\par Patient comes in for follow up visit, she is on day 12 of 28 of chemoradiation, patient has been having burning and itching in the vaginal and perineal areas patient tried monistat with no relief, she then tried hydrocortisone which worsened the burning. The patient also states that she has been having diarrhea, 12 episodes a day, patient has not been having good intake and is not drinking a lot of water. The patient also endorses nausea and vomiting, she states that the zofran does not help her and makes her more nauseous and the compazine makes her sleepy. Patient also states she is weaker and more tired. Her counts have slowly been trending down due to radiation. \par \par 8/7/17\par Patient comes in today for follow up, feels weak and tired, states that she is still having diarrhea but it has improved. She was prescribed immodium on the last visit which she took, but it made her constipated and when she stopped taking it, there was severe diarrhea. The patient also has nausea with some mild vomiting and dry heaves. The patient also states that she has no appetite and is not eating much. The patient continues to have burning and pruritis in the perineal and vaginal areas and was prescribed aquaphor cream by radiation with little relief. \par \par 8/14/17\par Still has 8-12 bowel movements and describes it as diarrhea, some are formed but mainly liquid but not so much.  She stopped Imodium since it made her not go at all. Still has 6 days of chemoradiation. Dizzy rarely. Drinks 16-32 oz of gatorade. Ucx was normal.\par \par 8/31/17\par She feels much better now. Skin feels better and is eating. \par \par \par 10/11/17\par She is her for follow up. She had a MR of rectum 10/2/17 that showed  Since June 29, 2017, decreased size polypoid rectal mass now measuring 4.5\par cm (previously 5.6 cm), with overall unchanged perirectal adenopathy;\par desmoplastic reaction limits evaluation for primary tumor staging.   New 0.9 cm enhancing lesion in the left iliac bone, suspicious for osseous\par metastasis; further evaluation with nuclear medicine bone scan may be of use.\par I reviewed the images with Dr. Clark and the bone finding on MR has been there on all the previous MR and have not changed in size but did enhance more. He though it may have been even a cyst. He also recommended a bone scan  to make sure. She had a Bone scan on 10/10/17 No evidence for metastatic bone disease. Specifically, no abnormal radiotracer uptake within left iliac bone.\par \par She feels well otherwise and she scheduled to undergo surgery  on October 23. Her PET/CT scan is still pending.\par \par \par 11/16/17\par She is here for follow up. She underwent  PET CT scan on 10/13/17 that did not show any distant mets. She than a underwent robotic assisted low anterior resection on October 23, 2017. 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 It was R0 resection. \par \par She was presented in our tumor board and further adjuvant chemotherapy was recommended. She saw Dr. Collins who recommended capecitabine 1250 mg/m2 bid\par \par She recovered well from surgery. She has numnbess in her feet from previous oxalipaltin. Gabapentin  is not helping.\par \par 12/4/17\par She is here for follow up.  She was seen in [facility] and they recommneded capecitabine 1500 mg BID 2 weeks on and 1 week off for 3 cycles. Given her weight loss and her bowel movements not normalizing they recommended to hold capecitabine for now, have her see PMD, see physical therapy and to have restaging PET/CT and MR Pelvis prior to restarting adjuvant chemotherapy.\par \par She does feel depressed but that is because she cant pay her bills. She did not want to see pshychiatrist. She also has a cough, dry for 4 weeks but did have a URI about 4 weeks ago. No other complaints.\par \par 1/8/18\par She is here for follow up. She had a PET CT 12/19/18 that most likely showed post surgical changes and an MR is pending next week for conformation. Emotionally she is better. She has gained weight. \par \par 1/30/18\par She is here for follow up. She had the MR of pelvis on 1/12/18: Status post lower anterior rectal resection, without MRI evidence of tumor recurrence. She was started on her 1st of 3 cycles of Capecitabine.  She developed nausea and diarrhea on her day 15 of capecitabine and stopped. She though she needs to take 21 days on and 7 days off. She only took 14 days. The nausea and vomiting and diarrhea resolve and may have been due to a viral illness. \par \par \par 2/21/18\par She is here for follow up. She is to start cycle 3 of 3 of capecitabine but has had a URI so has her foster daughter. She was seen by PMD reports negative flu and CXR and is now on Zpack. We decided to start the cape once she recovers.  She is complaining of hemorrhoids and wishes to see the Surgeon.\par \par 4/11/18\par She is doing well. Recently had a hemeroidectomy by Dr. Lopez and recovered well. She has no complaints. She completed her adjuvant cape on 3/2018\par \par 7/3/18\par She is here for follow up. Recently since about 6/22/18 she has been light headed, not dizzy. It does not happen every day. she denies any bleeding weight is stable. She had blood work from 6/2018 by PMD it was reviewed just mild decrease WBC with mild decrease in lymphs normal hgb, and platelets.

## 2018-07-06 LAB — CEA SERPL-MCNC: 2.4 NG/ML

## 2018-07-16 ENCOUNTER — OUTPATIENT (OUTPATIENT)
Dept: OUTPATIENT SERVICES | Facility: HOSPITAL | Age: 48
LOS: 1 days | Discharge: HOME | End: 2018-07-16

## 2018-07-16 DIAGNOSIS — C20 MALIGNANT NEOPLASM OF RECTUM: ICD-10-CM

## 2018-07-16 DIAGNOSIS — Z98.890 OTHER SPECIFIED POSTPROCEDURAL STATES: Chronic | ICD-10-CM

## 2018-07-20 PROBLEM — C80.1 MALIGNANT (PRIMARY) NEOPLASM, UNSPECIFIED: Chronic | Status: ACTIVE | Noted: 2018-03-19

## 2018-07-20 PROBLEM — K21.9 GASTRO-ESOPHAGEAL REFLUX DISEASE WITHOUT ESOPHAGITIS: Chronic | Status: ACTIVE | Noted: 2018-03-19

## 2018-07-20 PROBLEM — I49.9 CARDIAC ARRHYTHMIA, UNSPECIFIED: Chronic | Status: ACTIVE | Noted: 2018-03-19

## 2018-07-23 ENCOUNTER — LABORATORY RESULT (OUTPATIENT)
Age: 48
End: 2018-07-23

## 2018-07-23 ENCOUNTER — APPOINTMENT (OUTPATIENT)
Dept: HEMATOLOGY ONCOLOGY | Facility: CLINIC | Age: 48
End: 2018-07-23

## 2018-07-23 ENCOUNTER — OUTPATIENT (OUTPATIENT)
Dept: OUTPATIENT SERVICES | Facility: HOSPITAL | Age: 48
LOS: 1 days | Discharge: HOME | End: 2018-07-23

## 2018-07-23 DIAGNOSIS — C19 MALIGNANT NEOPLASM OF RECTOSIGMOID JUNCTION: ICD-10-CM

## 2018-07-23 DIAGNOSIS — C20 MALIGNANT NEOPLASM OF RECTUM: ICD-10-CM

## 2018-07-23 DIAGNOSIS — Z00.00 ENCOUNTER FOR GENERAL ADULT MEDICAL EXAMINATION W/OUT ABNORMAL FINDINGS: ICD-10-CM

## 2018-07-23 DIAGNOSIS — Z98.890 OTHER SPECIFIED POSTPROCEDURAL STATES: Chronic | ICD-10-CM

## 2018-07-24 LAB
ALBUMIN SERPL ELPH-MCNC: 3.8 G/DL
ALP BLD-CCNC: 120 U/L
ALT SERPL-CCNC: 7 U/L
ANION GAP SERPL CALC-SCNC: 17 MMOL/L
AST SERPL-CCNC: 14 U/L
BILIRUB SERPL-MCNC: 0.3 MG/DL
BUN SERPL-MCNC: 16 MG/DL
CALCIUM SERPL-MCNC: 9.5 MG/DL
CHLORIDE SERPL-SCNC: 101 MMOL/L
CO2 SERPL-SCNC: 23 MMOL/L
CREAT SERPL-MCNC: 0.5 MG/DL
GLUCOSE SERPL-MCNC: 105 MG/DL
HCT VFR BLD CALC: 32.6 %
HGB BLD-MCNC: 11.2 G/DL
INR PPP: 1.09 RATIO
MCHC RBC-ENTMCNC: 30.4 PG
MCHC RBC-ENTMCNC: 34.4 G/DL
MCV RBC AUTO: 88.6 FL
PLATELET # BLD AUTO: 237 K/UL
PMV BLD: 8.6 FL
POTASSIUM SERPL-SCNC: 4 MMOL/L
PROT SERPL-MCNC: 6.9 G/DL
PT BLD: 11.8 SEC
RBC # BLD: 3.68 M/UL
RBC # FLD: 13.1 %
SODIUM SERPL-SCNC: 141 MMOL/L
WBC # FLD AUTO: 4.17 K/UL

## 2018-07-26 LAB
HBA1C MFR BLD HPLC: 5.7 %
TSH SERPL-ACNC: 3.1 UIU/ML

## 2018-08-06 ENCOUNTER — OUTPATIENT (OUTPATIENT)
Dept: OUTPATIENT SERVICES | Facility: HOSPITAL | Age: 48
LOS: 1 days | Discharge: HOME | End: 2018-08-06

## 2018-08-06 DIAGNOSIS — Z98.890 OTHER SPECIFIED POSTPROCEDURAL STATES: Chronic | ICD-10-CM

## 2018-08-06 NOTE — PROGRESS NOTE ADULT - SUBJECTIVE AND OBJECTIVE BOX
Interventional Radiology Outpatient Documentation    PREOPERATIVE DAY OF PROCEDURE EVALUATION:     I have personally seen and examined this patient. I agree with the history and physical which I have reviewed and noted any changes below:     Plan is for port removal (Signed electronically Tawnya Palmer MD) 08-06-18 @ 09:14     Procedure/ risks/ benefits/ goals/ alternatives were explained. All questions answered. Informed content obtained from patient. Consent placed in chart.     POSTOPERATIVE PROCEDURAL EVALUATION:     Procedure: Port removal     Pre-Op Diagnosis: Adenocarcinoma    Post-Op Diagnosis: as above    Attending: Estela  Resident: None    Anesthesia (type):  [ ] General Anesthesia  [ x] Sedation  [ ] Spinal Anesthesia  [ x] Local/Regional    Contrast: None    Estimated Blood Loss: Minimal, < 5 cc    Condition:   [ ] Critical  [ ] Serious  [ ] Fair   [ ] Good    Findings/Follow up Plan of Care:    See full report in pacs    Specimens Removed: None    Implants: None    Complications: None    Disposition: Recovery

## 2018-08-09 DIAGNOSIS — F41.9 ANXIETY DISORDER, UNSPECIFIED: ICD-10-CM

## 2018-08-09 DIAGNOSIS — E78.00 PURE HYPERCHOLESTEROLEMIA, UNSPECIFIED: ICD-10-CM

## 2018-08-09 DIAGNOSIS — Z45.2 ENCOUNTER FOR ADJUSTMENT AND MANAGEMENT OF VASCULAR ACCESS DEVICE: ICD-10-CM

## 2018-08-09 DIAGNOSIS — Z85.048 PERSONAL HISTORY OF OTHER MALIGNANT NEOPLASM OF RECTUM, RECTOSIGMOID JUNCTION, AND ANUS: ICD-10-CM

## 2018-09-17 ENCOUNTER — OUTPATIENT (OUTPATIENT)
Dept: OUTPATIENT SERVICES | Facility: HOSPITAL | Age: 48
LOS: 1 days | Discharge: HOME | End: 2018-09-17

## 2018-09-17 DIAGNOSIS — Z98.890 OTHER SPECIFIED POSTPROCEDURAL STATES: Chronic | ICD-10-CM

## 2018-09-17 DIAGNOSIS — M79.672 PAIN IN LEFT FOOT: ICD-10-CM

## 2018-10-19 ENCOUNTER — OUTPATIENT (OUTPATIENT)
Dept: OUTPATIENT SERVICES | Facility: HOSPITAL | Age: 48
LOS: 1 days | Discharge: HOME | End: 2018-10-19

## 2018-10-19 ENCOUNTER — RESULT REVIEW (OUTPATIENT)
Age: 48
End: 2018-10-19

## 2018-10-19 VITALS
HEART RATE: 61 BPM | SYSTOLIC BLOOD PRESSURE: 155 MMHG | DIASTOLIC BLOOD PRESSURE: 69 MMHG | RESPIRATION RATE: 18 BRPM | OXYGEN SATURATION: 100 %

## 2018-10-19 VITALS
RESPIRATION RATE: 18 BRPM | WEIGHT: 162.92 LBS | DIASTOLIC BLOOD PRESSURE: 82 MMHG | HEIGHT: 64 IN | TEMPERATURE: 98 F | SYSTOLIC BLOOD PRESSURE: 139 MMHG | HEART RATE: 56 BPM

## 2018-10-19 DIAGNOSIS — Z08 ENCOUNTER FOR FOLLOW-UP EXAMINATION AFTER COMPLETED TREATMENT FOR MALIGNANT NEOPLASM: ICD-10-CM

## 2018-10-19 DIAGNOSIS — Z98.0 INTESTINAL BYPASS AND ANASTOMOSIS STATUS: ICD-10-CM

## 2018-10-19 DIAGNOSIS — D12.2 BENIGN NEOPLASM OF ASCENDING COLON: ICD-10-CM

## 2018-10-19 DIAGNOSIS — Z90.49 ACQUIRED ABSENCE OF OTHER SPECIFIED PARTS OF DIGESTIVE TRACT: ICD-10-CM

## 2018-10-19 DIAGNOSIS — Z98.890 OTHER SPECIFIED POSTPROCEDURAL STATES: Chronic | ICD-10-CM

## 2018-10-19 DIAGNOSIS — D12.3 BENIGN NEOPLASM OF TRANSVERSE COLON: ICD-10-CM

## 2018-10-19 DIAGNOSIS — K64.8 OTHER HEMORRHOIDS: ICD-10-CM

## 2018-10-19 DIAGNOSIS — K21.9 GASTRO-ESOPHAGEAL REFLUX DISEASE WITHOUT ESOPHAGITIS: ICD-10-CM

## 2018-10-19 DIAGNOSIS — Z85.038 PERSONAL HISTORY OF OTHER MALIGNANT NEOPLASM OF LARGE INTESTINE: ICD-10-CM

## 2018-10-19 DIAGNOSIS — Z85.048 PERSONAL HISTORY OF OTHER MALIGNANT NEOPLASM OF RECTUM, RECTOSIGMOID JUNCTION, AND ANUS: ICD-10-CM

## 2018-10-19 NOTE — H&P ADULT - ASSESSMENT
48 year old female with history of colon cancer s/p resection is here for elective colonoscopy for surveillance.

## 2018-10-19 NOTE — ASU DISCHARGE PLAN (ADULT/PEDIATRIC). - NOTIFY
Fever greater than 101/Excessive Diarrhea/Persistent Nausea and Vomiting/Bleeding that does not stop

## 2018-10-19 NOTE — ASU PATIENT PROFILE, ADULT - PSH
History of hemorrhoidectomy    History of surgery  removal rectal tumor  heart ablation x2 (2010 & 2011)  tonsillectomy  cholecystectomy H/O colonoscopy  2017, rectal cancer  History of hemorrhoidectomy    History of surgery  removal rectal tumor  heart ablation x2 (2010 & 2011)  tonsillectomy  cholecystectomy

## 2018-10-19 NOTE — CHART NOTE - NSCHARTNOTEFT_GEN_A_CORE
PACU ANESTHESIA ADMISSION NOTE      Procedure:   Post op diagnosis:      ____  Intubated  TV:______       Rate: ______      FiO2: ______    _x___  Patent Airway    _x___  Full return of protective reflexes    _x___  Full recovery from anesthesia / back to baseline status    Vitals:            T:                BP :   160/70             R:  20            Sat:     100          P:73      Mental Status:  _x___ Awake   _____ Alert   _____ Drowsy   _____ Sedated    Nausea/Vomiting:  _x___  NO       ______Yes,   See Post - Op Orders         Pain Scale (0-10):  __0___    Treatment: _x___ None    ____ See Post - Op/PCA Orders    Post - Operative Fluids:   __x__ Oral   ____ See Post - Op Orders    Plan: Discharge:   _x___Home       _____Floor     _____Critical Care    _____  Other:_________________    Comments:  No anesthesia issues or complications noted.  Discharge when criteria met.

## 2018-10-23 LAB — SURGICAL PATHOLOGY STUDY: SIGNIFICANT CHANGE UP

## 2018-11-13 ENCOUNTER — APPOINTMENT (OUTPATIENT)
Dept: HEMATOLOGY ONCOLOGY | Facility: CLINIC | Age: 48
End: 2018-11-13

## 2018-12-29 ENCOUNTER — TRANSCRIPTION ENCOUNTER (OUTPATIENT)
Age: 48
End: 2018-12-29

## 2019-04-23 ENCOUNTER — OUTPATIENT (OUTPATIENT)
Dept: OUTPATIENT SERVICES | Facility: HOSPITAL | Age: 49
LOS: 1 days | Discharge: HOME | End: 2019-04-23

## 2019-04-23 DIAGNOSIS — E11.9 TYPE 2 DIABETES MELLITUS WITHOUT COMPLICATIONS: ICD-10-CM

## 2019-04-23 DIAGNOSIS — R42 DIZZINESS AND GIDDINESS: ICD-10-CM

## 2019-04-23 DIAGNOSIS — Z98.890 OTHER SPECIFIED POSTPROCEDURAL STATES: Chronic | ICD-10-CM

## 2019-04-23 DIAGNOSIS — R53.81 OTHER MALAISE: ICD-10-CM

## 2019-04-23 DIAGNOSIS — E78.5 HYPERLIPIDEMIA, UNSPECIFIED: ICD-10-CM

## 2019-04-29 ENCOUNTER — TRANSCRIPTION ENCOUNTER (OUTPATIENT)
Age: 49
End: 2019-04-29

## 2019-04-30 ENCOUNTER — APPOINTMENT (OUTPATIENT)
Dept: HEMATOLOGY ONCOLOGY | Facility: CLINIC | Age: 49
End: 2019-04-30

## 2019-05-24 ENCOUNTER — OUTPATIENT (OUTPATIENT)
Dept: OUTPATIENT SERVICES | Facility: HOSPITAL | Age: 49
LOS: 1 days | Discharge: HOME | End: 2019-05-24
Payer: COMMERCIAL

## 2019-05-24 ENCOUNTER — TRANSCRIPTION ENCOUNTER (OUTPATIENT)
Age: 49
End: 2019-05-24

## 2019-05-24 ENCOUNTER — RESULT REVIEW (OUTPATIENT)
Age: 49
End: 2019-05-24

## 2019-05-24 VITALS
SYSTOLIC BLOOD PRESSURE: 158 MMHG | WEIGHT: 179.9 LBS | DIASTOLIC BLOOD PRESSURE: 95 MMHG | HEIGHT: 63 IN | TEMPERATURE: 98 F | HEART RATE: 70 BPM | RESPIRATION RATE: 18 BRPM

## 2019-05-24 VITALS — DIASTOLIC BLOOD PRESSURE: 93 MMHG | SYSTOLIC BLOOD PRESSURE: 140 MMHG | HEART RATE: 63 BPM | RESPIRATION RATE: 18 BRPM

## 2019-05-24 DIAGNOSIS — Z98.890 OTHER SPECIFIED POSTPROCEDURAL STATES: Chronic | ICD-10-CM

## 2019-05-24 PROCEDURE — 88305 TISSUE EXAM BY PATHOLOGIST: CPT | Mod: 26

## 2019-05-24 NOTE — H&P ADULT - HISTORY OF PRESENT ILLNESS
Patient is a 50 y/o with PMHx of Colon Carcinoma and previous EMR of an SSA that presents for screening colonoscopy.

## 2019-05-24 NOTE — CHART NOTE - NSCHARTNOTEFT_GEN_A_CORE
PACU ANESTHESIA ADMISSION NOTE      Procedure: Colonoscopy        ____  Intubated  TV:______       Rate: ______      FiO2: ______    _x___  Patent Airway    _x___  Full return of protective reflexes    _x___  Full recovery from anesthesia / back to baseline status    Vitals:  T(C): 36.9  HR: 58  BP: 110/59  RR: 18  SpO2: 100    Mental Status:  _x___ Awake   _____ Alert   _____ Drowsy   _____ Sedated    Nausea/Vomiting:  _x___  NO       ______Yes,   See Post - Op Orders         Pain Scale (0-10):  __0___    Treatment: _x___ None    ____ See Post - Op/PCA Orders    Post - Operative Fluids:   __x__ Oral   ____ See Post - Op Orders    Plan: Discharge:   _x___Home       _____Floor     _____Critical Care    _____  Other:_________________    Comments:  No anesthesia issues or complications noted.  Discharge when criteria met.

## 2019-05-24 NOTE — H&P ADULT - ASSESSMENT
Patient is a 50 y/o with PMHx of Colon Carcinoma and previous EMR of an SSA that presents for screening colonoscopy.     Plan CRC

## 2019-05-24 NOTE — ASU PATIENT PROFILE, ADULT - PSH
H/O colonoscopy  10/18  H/O colonoscopy  2017, rectal cancer  History of hemorrhoidectomy    History of surgery  removal rectal tumor  heart ablation x2 (2010 & 2011)  tonsillectomy  cholecystectomy

## 2019-05-28 LAB — SURGICAL PATHOLOGY STUDY: SIGNIFICANT CHANGE UP

## 2019-05-30 DIAGNOSIS — Z90.49 ACQUIRED ABSENCE OF OTHER SPECIFIED PARTS OF DIGESTIVE TRACT: ICD-10-CM

## 2019-05-30 DIAGNOSIS — Z85.038 PERSONAL HISTORY OF OTHER MALIGNANT NEOPLASM OF LARGE INTESTINE: ICD-10-CM

## 2019-05-30 DIAGNOSIS — K64.4 RESIDUAL HEMORRHOIDAL SKIN TAGS: ICD-10-CM

## 2019-05-30 DIAGNOSIS — K57.30 DIVERTICULOSIS OF LARGE INTESTINE WITHOUT PERFORATION OR ABSCESS WITHOUT BLEEDING: ICD-10-CM

## 2019-05-30 DIAGNOSIS — Z12.11 ENCOUNTER FOR SCREENING FOR MALIGNANT NEOPLASM OF COLON: ICD-10-CM

## 2019-05-30 DIAGNOSIS — K64.8 OTHER HEMORRHOIDS: ICD-10-CM

## 2019-05-30 DIAGNOSIS — D12.5 BENIGN NEOPLASM OF SIGMOID COLON: ICD-10-CM

## 2019-06-01 ENCOUNTER — OUTPATIENT (OUTPATIENT)
Dept: OUTPATIENT SERVICES | Facility: HOSPITAL | Age: 49
LOS: 1 days | Discharge: HOME | End: 2019-06-01

## 2019-06-01 DIAGNOSIS — Z98.890 OTHER SPECIFIED POSTPROCEDURAL STATES: Chronic | ICD-10-CM

## 2019-06-03 DIAGNOSIS — Z13.820 ENCOUNTER FOR SCREENING FOR OSTEOPOROSIS: ICD-10-CM

## 2019-06-03 DIAGNOSIS — Z87.310 PERSONAL HISTORY OF (HEALED) OSTEOPOROSIS FRACTURE: ICD-10-CM

## 2019-06-03 DIAGNOSIS — Z78.0 ASYMPTOMATIC MENOPAUSAL STATE: ICD-10-CM

## 2019-06-03 DIAGNOSIS — M89.9 DISORDER OF BONE, UNSPECIFIED: ICD-10-CM

## 2019-06-04 ENCOUNTER — LABORATORY RESULT (OUTPATIENT)
Age: 49
End: 2019-06-04

## 2019-06-04 ENCOUNTER — OUTPATIENT (OUTPATIENT)
Dept: OUTPATIENT SERVICES | Facility: HOSPITAL | Age: 49
LOS: 1 days | Discharge: HOME | End: 2019-06-04

## 2019-06-04 ENCOUNTER — APPOINTMENT (OUTPATIENT)
Dept: HEMATOLOGY ONCOLOGY | Facility: CLINIC | Age: 49
End: 2019-06-04
Payer: COMMERCIAL

## 2019-06-04 VITALS
DIASTOLIC BLOOD PRESSURE: 70 MMHG | SYSTOLIC BLOOD PRESSURE: 125 MMHG | HEIGHT: 63 IN | HEART RATE: 76 BPM | TEMPERATURE: 98.6 F | RESPIRATION RATE: 14 BRPM

## 2019-06-04 DIAGNOSIS — Z98.890 OTHER SPECIFIED POSTPROCEDURAL STATES: Chronic | ICD-10-CM

## 2019-06-04 PROCEDURE — 99213 OFFICE O/P EST LOW 20 MIN: CPT

## 2019-06-05 LAB
ALBUMIN SERPL ELPH-MCNC: 4.3 G/DL
ALP BLD-CCNC: 116 U/L
ALT SERPL-CCNC: 11 U/L
ANION GAP SERPL CALC-SCNC: 16 MMOL/L
AST SERPL-CCNC: 19 U/L
BILIRUB SERPL-MCNC: 0.5 MG/DL
BUN SERPL-MCNC: 15 MG/DL
CALCIUM SERPL-MCNC: 9.9 MG/DL
CEA SERPL-MCNC: 2.1 NG/ML
CHLORIDE SERPL-SCNC: 98 MMOL/L
CO2 SERPL-SCNC: 25 MMOL/L
CREAT SERPL-MCNC: 0.6 MG/DL
GLUCOSE SERPL-MCNC: 96 MG/DL
HCT VFR BLD CALC: 38.2 %
HGB BLD-MCNC: 12.9 G/DL
MCHC RBC-ENTMCNC: 30.7 PG
MCHC RBC-ENTMCNC: 33.8 G/DL
MCV RBC AUTO: 91 FL
PLATELET # BLD AUTO: 323 K/UL
PMV BLD: 8.9 FL
POTASSIUM SERPL-SCNC: 4.8 MMOL/L
PROT SERPL-MCNC: 7.6 G/DL
RBC # BLD: 4.2 M/UL
RBC # FLD: 12.8 %
SODIUM SERPL-SCNC: 139 MMOL/L
WBC # FLD AUTO: 4.46 K/UL

## 2019-06-07 NOTE — ASSESSMENT
[Curative] : Goals of care discussed with patient: Curative [FreeTextEntry1] : 49  year old  female with clinical T3CN1 rectosigmoid adenocarcinoma on neoadjuvant chemotherapy with FOLFOX  s/p 8 cycles  then to be followed by chemoradiation than TME. MRI showed response after cycle 4 but progression post cycle 8 surgery was not recommended t and she   completed   Chemoradiation 8/21/17.  Her CEA normalized but post treatment MR rectum showed response to tumor but a   a 0.9 cm enhancing lesion in the left iliac bone was found.  All are MRIs were reviewed and apparently that lesion has been there from the beginning but was not enhancing. It is not seen on the CAT scan. Bone scan was negative. Thus was felt to be a benign finding, and enhancement near the due to treatment effect. Her PET was negative  She than a underwent robotic assisted low anterior resection on October 23, 2017. 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 It was R0 resection.  She completed 3 cycles of Capecitabine on 3/2018\par \par Weight loss and decondition\par -Multifactorial. This  resolved \par \par Cough \par resolved.\par \par Recent Diarrhea and Nausea and vomiting due to viral illness   Resolved\par \par Lightheadedness, work up by PMD in progress, recommended hydration \par \par Plan:\par -Will check CBC CMP, CEA  and restaging CT C/A/P \par -she will see me in 6\par - Repeat Colonoscopy as per Dr. Scott \par -will call with results \par \par

## 2019-06-07 NOTE — REVIEW OF SYSTEMS
[Fever] : no fever [Chills] : no chills [Night Sweats] : no night sweats [Fatigue] : no fatigue [Recent Change In Weight] : ~T no recent weight change [Cough] : no cough [SOB on Exertion] : no shortness of breath during exertion [Vomiting] : no vomiting [Abdominal Pain] : no abdominal pain [Diarrhea] : no diarrhea [Negative] : Neurological [de-identified] : No complaints today

## 2019-06-07 NOTE — RESULTS/DATA
[FreeTextEntry1] : Check 5 on Selected Components for PET CT WHOLE BODY SKULL BASE TO MID- \par     TEST: PET CT WHOLE BODY SKULL BASE TO MID-\par  \par     Collected Date & Time: 2017 13:36\par  \par        \par     Result Name Results Units Reference Range \par      PET CT WHOLE BODY SKULL BASE TO MID-   SEE TEXT       \par     Patient Name: IVANNA NIEVES : 1970\par Patient ID: [id number]\par Account: [id number]\par Patient Location: [facility]\par Accession: [id number]\par Procedure: PET CT WHOLE BODY SKULL BASE TO MID-THIGH SUBSEQUENT [id number]\par Date of Exam: 2017 01:36 PM\par Attending Physician: GIULIANA REBOLLEDO\par Requesting Physician: GIULIANA REBOLLEDO MD\par ivanna nieves\par FDG PET CT STUDY, SUBSEQUENT TREATMENT STRATEGY\par REASON: TUMOR IMAGING - PET with concurrently acquired CT for attenuation\par correction and anatomic localization; skull base to mid - thigh /\par [address]/colorectal carcinoma, subsequent treatment strategy\par ICD10M code:C 21;C 18.9\par HISTORY: This a  47 -year-old patient with rectosigmoid adenocarcinoma\par diagnosed 2017 status post chemoradiation therapy and underwent \par robotic\par assisted low anterior resection 10/23/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 \par T3\par N1b-R0 resection\par FDG PET CT study 10/13/2017 compared  showed no new definite sites of\par pathologic FDG uptake; anatomically decreased size of FDG avid mid to high\par rectal mass approximately 4 cm in length with SUV 16.9 vs 25.8; focal mildly\par increased uptake in 0.6 cm subcutaneous lesion on the left back (SUV 3) likely\par infectious or inflammatory; interval resolution of previously described FDG \par avid\par perirectal lymph node; no abnormal radiotracer uptake at the site of left \par iliac\par focus of increased enhancement on MRI (negative bone scan October 10, 2017)\par CT of abdomen and pelvis 2017 showed presacral fluid and air which\par may be postoperative change\par Last chemotherapy and radiation therapy 2017\par  Blood glucose pre injection 108 mg/dL\par TECHNIQUE: Approximately 45 minutes after the intravenous administration of\par 12.5 mCi 18-Fluorine FDG, whole body PET images were acquired from base of \par skull\par to mid - thigh. In addition, non-contrast, low dose, non - diagnostic CT was\par acquired for attenuation correction and anatomic correlation purposes only.\par These images reveal compared to 10/13/2017, a new finding of abnormal FDG\par uptake (SUV 5.6) in the presacral region coregistering with air collection \par with\par peripheral fluid/phlegmonous change on review with body imaging. The sacral \par air\par and fluid collection has improved compared to 2017..\par Previous FDG avid rectal mass has been surgically resected.\par Previous focus of uptake in the left back subcutaneous lesion is no longer\par present and probably represented inflammatory uptake at that time\par No other sites of abnormal FDG uptake. The site of MRI abnormality in the left\par iliac bone is again non-FDG avid\par IMPRESSION:\par Compared to 10/13/2017, a new finding of abnormal FDG uptake (SUV 5.6) in the\par presacral region coregistering with air collection with peripheral fluid and\par edema - (on CT, the presacral air collection and edema has improved compared \par to\par 10/31/2017) ..\par Previous FDG avid rectal mass has been surgically resected.\par The site of MRI abnormality in the left iliac bone is again non-FDG avid\par No other sites of abnormal FDG uptake\par RIOS BAUTISTA D.O., M.M.M, C.P.E.\par Spoke with GIULIANA REBOLLEDO MD on 2017 3:28 PM with readback. We\par discussed the fact that it is unclear whether this represents postoperative\par change, less likely inflammatory uptake versus tumor recurrence. The patient \par is\par already being scheduled for repeat MRI, it may be that the MRI will help to\par differentiate between recurrence and post operative change\par I the attending attest that I have personally reviewed these images and agree\par with this report.\par Original final report dictated and signed by Dr. Rios Bautista and Dr. vickey Velazquez\par [place] on 2017 03:33 PM\par  \par

## 2019-06-07 NOTE — PHYSICAL EXAM
[Fully active, able to carry on all pre-disease performance without restriction] : Status 0 - Fully active, able to carry on all pre-disease performance without restriction [Obese] : obese [Normal] : affect appropriate [Mucositis] : no mucositis [Thrush] : no thrush [Vesicles] : no vesicles

## 2019-06-07 NOTE — HISTORY OF PRESENT ILLNESS
[Therapy: ___] : Therapy: [unfilled] [Day: ___] : Day: [unfilled] [Cycle: ___] : Cycle: [unfilled] [de-identified] : 47 year old female, who was having watery diarrhea for couple of months associated with some blood Per rectum on wiping. No naren blood. denies abdominal pain or rectal pain. Associated with weight loss of about 20 lbs over 6 months.\par Following which she had elective colonoscopy and EGD in 9th December 2016, EGD showed normal esophagus and duodenum with erythematous mucosa in the antrum, the pathology of which was chronic gastritis.\par \par Colonoscopy (12/9/2016): External grade 3 hemorrhoids with polyp in the sigmoid colon, 15 cm from the entry site, polypectomy was not done and another sessile polyp in the ascending colon, which was removed by snare polypectomy. 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 \par On 01/16/17, she underwent colonoscopy and was found to have 3 cm frond like/villous, ulcerated and friable mass lesion in the sigmoid colon at 20 cm from entry site, which was oozing blood. Polypectomy was amenable and biopsy was taken. Multiple biopsies were taken. It was suspicious for adenocarcinoma. \par \par Pathology: Fragments of adenocarcinoma, at least intramucosal with foci highly suspicious of invasion. No mismatch repair present.\par \par 01/20/17: Underwent CT abdomen/pelvis: Rectosigmoid  mass,  measuring  5.5  cm  CC,  consistent  with  index  lesion,  with  lower  tumor  margin  approximately  8  cm  from  anal  verge,  within  mid-rectum.  \par   Perisigmoid  partially  necrotic  1.9  x  1.7  cm  lymph  node. 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  Right  adrenal  2  cm  nodule,  indeterminate  on  postcontrast  CT,  however  likely  represents  adenoma;\par \par She was then evaluated by surgical oncology Dr Syed, who after seeing the imaging studies, thought the lesion is more rectal and thought the patient would benefit from receiving neoadjuvant chemoRT and provided there is a good response, can achieve R0 resection.\par MRI Rectum was ordered, which is due on 01/27 and PET CT 01/30.  \par \par The patient is here to discuss her options of chemotherapy, chemoRT and further plan of care.\par \par At this time today, patient does have watery diarrhea and blood MA on wiping. Denies abdominal pain. Weight loss of about 20 lbs in past 6 months. on and off vomiting, denies nausea or fever, chills or rigors. [de-identified] : Recto sigmoid adenocarcinoma [de-identified] : CBC: 12.8, platelets: 428, wbc: 6.5\par LFT's: wnl\par Craetinine: 0.65\par Chest x ray: no evidence of acute pulmonary disease. [de-identified] : CEA 3.4, CA 19-9: 66.9,  : 17 [FreeTextEntry1] : 5FU bolus was stopped due to neutropenia.  She completed 8 cycles of Neoadjuvant FOLFOX and Comoleted Chemoradiation on 8/21/17. She started Adjuvant Capecitabine on January 2018.\par Completed 3/3 adjuvant capecitabine cycles  around 3/2018 [de-identified] : March 22nd 2017\par Patient returns for followup today. Since her last visit she underwent an MRI Of the rectum on 1/27/2017 that showed Polypoid rectal mass which straddles the anterior peritoneal reflection  with associated perirectal adenopathy. STAGE:  T3c N1 Mx. Necrotic perirectal lymph node noted on reference CT corresponds to a 1.9 x\par 1.7 cm left adnexal cyst on MRI.\par She also underwent a PET/CT scan In February 2, 2017 and demonstrated Intense pathologic FDG uptake (SUV 25.8 ) coregistering with rectosigmoid mass\par consistent with documented biologic tumor activity. One FDG avid (SUV 8.1) 1.1 x 1.3 cm perirectal lymph node . No other sites of abnormal FDG uptake.\par \par She was presented at our tumor board. Another discussion and review of data we decided to proceed with  neoadjuvant chemotherapy with FOLFOX for 4 months with every 2 months assessments of MRI this is to follow with chemoradiation and than TME.\par A port has been placed. She started FOLFOX and currently presents Prior to cycle 3. Her length of medication so far has been mild intermittent neuropathy and neutropenia and required delaying chemotherapy as well as discontinuation of 5-FU bolus and 3 days of Neupogen.\par \par She has no other complaints.\par \par 4/19/17\par She is doing well except for some nausea for which  Compazine helps. She had an MRI of Rectum after 4 cycles  that showed  4/10/17:  decreased size of the polypoid rectal mass, now predominantly above the peritoneal reflection measuring 3.6 cm in length,\par previously 4.8 cm.\par \par 5/17/17 \par She presents for follow up today. She is doing well. She does have fatigue, no neuropathy. She is loosing her job and disibility and bills may be a problem she is in contact with our social workers.\par \par 6/7/17\par She presents for follow up. Only complaint is fatuge. Her last cycle was delayde due to neutropenia. We dropped the dose of 5FU and Oxoli by 20% and added neupogen for 3 days.  Counts from today still show mild neutropenia. \par \par 7/10/17\par She presents for follow up today. She had MRI rectum that unfortunately showed increase in her tumor. (6/29/17: .  Since April 10, 2017, increased size of polypoid rectal mass, which now straddles the peritoneal reflection measuring 5.6 (previously 3.6cm) and increased perirectal adenopathy; desmoplastic reaction limits evaluation for\par primary tumor staging.\par  She was seen by Dr. Macias and he she was recommended to start chemoradiation. She is Doing well otherwise blood is having a hard time passing stool I recommended she continues with laxatives.\par \par 7/31/17\par Patient comes in for follow up visit, she is on day 12 of 28 of chemoradiation, patient has been having burning and itching in the vaginal and perineal areas patient tried monistat with no relief, she then tried hydrocortisone which worsened the burning. The patient also states that she has been having diarrhea, 12 episodes a day, patient has not been having good intake and is not drinking a lot of water. The patient also endorses nausea and vomiting, she states that the zofran does not help her and makes her more nauseous and the compazine makes her sleepy. Patient also states she is weaker and more tired. Her counts have slowly been trending down due to radiation. \par \par 8/7/17\par Patient comes in today for follow up, feels weak and tired, states that she is still having diarrhea but it has improved. She was prescribed immodium on the last visit which she took, but it made her constipated and when she stopped taking it, there was severe diarrhea. The patient also has nausea with some mild vomiting and dry heaves. The patient also states that she has no appetite and is not eating much. The patient continues to have burning and pruritis in the perineal and vaginal areas and was prescribed aquaphor cream by radiation with little relief. \par \par 8/14/17\par Still has 8-12 bowel movements and describes it as diarrhea, some are formed but mainly liquid but not so much.  She stopped Imodium since it made her not go at all. Still has 6 days of chemoradiation. Dizzy rarely. Drinks 16-32 oz of gatorade. Ucx was normal.\par \par 8/31/17\par She feels much better now. Skin feels better and is eating. \par \par \par 10/11/17\par She is her for follow up. She had a MR of rectum 10/2/17 that showed  Since June 29, 2017, decreased size polypoid rectal mass now measuring 4.5\par cm (previously 5.6 cm), with overall unchanged perirectal adenopathy;\par desmoplastic reaction limits evaluation for primary tumor staging.   New 0.9 cm enhancing lesion in the left iliac bone, suspicious for osseous\par metastasis; further evaluation with nuclear medicine bone scan may be of use.\par I reviewed the images with Dr. Clark and the bone finding on MR has been there on all the previous MR and have not changed in size but did enhance more. He though it may have been even a cyst. He also recommended a bone scan  to make sure. She had a Bone scan on 10/10/17 No evidence for metastatic bone disease. Specifically, no abnormal radiotracer uptake within left iliac bone.\par \par She feels well otherwise and she scheduled to undergo surgery  on October 23. Her PET/CT scan is still pending.\par \par \par 11/16/17\par She is here for follow up. She underwent  PET CT scan on 10/13/17 that did not show any distant mets. She than a underwent robotic assisted low anterior resection on October 23, 2017. 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 It was R0 resection. \par \par She was presented in our tumor board and further adjuvant chemotherapy was recommended. She saw Dr. Collins who recommended capecitabine 1250 mg/m2 bid\par \par She recovered well from surgery. She has numnbess in her feet from previous oxalipaltin. Gabapentin  is not helping.\par \par 12/4/17\par She is here for follow up.  She was seen in [facility] and they recommneded capecitabine 1500 mg BID 2 weeks on and 1 week off for 3 cycles. Given her weight loss and her bowel movements not normalizing they recommended to hold capecitabine for now, have her see PMD, see physical therapy and to have restaging PET/CT and MR Pelvis prior to restarting adjuvant chemotherapy.\par \par She does feel depressed but that is because she cant pay her bills. She did not want to see pshychiatrist. She also has a cough, dry for 4 weeks but did have a URI about 4 weeks ago. No other complaints.\par \par 1/8/18\par She is here for follow up. She had a PET CT 12/19/18 that most likely showed post surgical changes and an MR is pending next week for conformation. Emotionally she is better. She has gained weight. \par \par 1/30/18\par She is here for follow up. She had the MR of pelvis on 1/12/18: Status post lower anterior rectal resection, without MRI evidence of tumor recurrence. She was started on her 1st of 3 cycles of Capecitabine.  She developed nausea and diarrhea on her day 15 of capecitabine and stopped. She though she needs to take 21 days on and 7 days off. She only took 14 days. The nausea and vomiting and diarrhea resolve and may have been due to a viral illness. \par \par \par 2/21/18\par She is here for follow up. She is to start cycle 3 of 3 of capecitabine but has had a URI so has her foster daughter. She was seen by PMD reports negative flu and CXR and is now on Zpack. We decided to start the cape once she recovers.  She is complaining of hemorrhoids and wishes to see the Surgeon.\par \par 4/11/18\par She is doing well. Recently had a hemeroidectomy by Dr. Lopez and recovered well. She has no complaints. She completed her adjuvant cape on 3/2018\par \par 7/3/18\par She is here for follow up. Recently since about 6/22/18 she has been light headed, not dizzy. It does not happen every day. she denies any bleeding weight is stable. She had blood work from 6/2018 by PMD it was reviewed just mild decrease WBC with mild decrease in lymphs normal hgb, and platelets.\par \par 6/4/19\par She is here for follow up. She has not see un since 7/2018.  She had CT C/A/P in 7/2018 and was [facility]. She had a colonoscopy on  5/24/19 that showed  Polyp (6 mm) in the sigmoid colon. (Polypectomy).   Polyp (8 mm) in the sigmoid colon. (Polypectomy).   Moderate diverticulosis of the the left side of the colon.  Previously placed tattoo was seen around the anastomotic site.   Internal and external hemorrhoids. Path was benign.  Repeat colonoscopy  as per  Dr Scott

## 2019-06-07 NOTE — REASON FOR VISIT
[Initial Consultation] : an initial consultation [Follow-Up Visit] : a follow-up [Family Member] : family member [FreeTextEntry2] : Recto-Sigmoid Adenocarcinoma

## 2019-06-10 DIAGNOSIS — C20 MALIGNANT NEOPLASM OF RECTUM: ICD-10-CM

## 2019-06-18 DIAGNOSIS — Z01.818 ENCOUNTER FOR OTHER PREPROCEDURAL EXAMINATION: ICD-10-CM

## 2019-06-21 ENCOUNTER — FORM ENCOUNTER (OUTPATIENT)
Age: 49
End: 2019-06-21

## 2019-06-22 ENCOUNTER — OUTPATIENT (OUTPATIENT)
Dept: OUTPATIENT SERVICES | Facility: HOSPITAL | Age: 49
LOS: 1 days | Discharge: HOME | End: 2019-06-22
Payer: COMMERCIAL

## 2019-06-22 DIAGNOSIS — Z98.890 OTHER SPECIFIED POSTPROCEDURAL STATES: Chronic | ICD-10-CM

## 2019-06-22 DIAGNOSIS — C20 MALIGNANT NEOPLASM OF RECTUM: ICD-10-CM

## 2019-06-22 PROCEDURE — 71260 CT THORAX DX C+: CPT | Mod: 26

## 2019-06-22 PROCEDURE — 74177 CT ABD & PELVIS W/CONTRAST: CPT | Mod: 26

## 2019-08-20 ENCOUNTER — EMERGENCY (EMERGENCY)
Facility: HOSPITAL | Age: 49
LOS: 0 days | Discharge: HOME | End: 2019-08-20
Attending: EMERGENCY MEDICINE | Admitting: EMERGENCY MEDICINE
Payer: COMMERCIAL

## 2019-08-20 VITALS
HEART RATE: 80 BPM | DIASTOLIC BLOOD PRESSURE: 86 MMHG | TEMPERATURE: 97 F | SYSTOLIC BLOOD PRESSURE: 154 MMHG | RESPIRATION RATE: 17 BRPM | OXYGEN SATURATION: 96 %

## 2019-08-20 VITALS
RESPIRATION RATE: 18 BRPM | TEMPERATURE: 97 F | HEART RATE: 78 BPM | DIASTOLIC BLOOD PRESSURE: 94 MMHG | SYSTOLIC BLOOD PRESSURE: 144 MMHG | OXYGEN SATURATION: 100 %

## 2019-08-20 DIAGNOSIS — R07.9 CHEST PAIN, UNSPECIFIED: ICD-10-CM

## 2019-08-20 DIAGNOSIS — R00.2 PALPITATIONS: ICD-10-CM

## 2019-08-20 DIAGNOSIS — Z98.890 OTHER SPECIFIED POSTPROCEDURAL STATES: Chronic | ICD-10-CM

## 2019-08-20 DIAGNOSIS — R07.89 OTHER CHEST PAIN: ICD-10-CM

## 2019-08-20 LAB
ALBUMIN SERPL ELPH-MCNC: 4.6 G/DL — SIGNIFICANT CHANGE UP (ref 3.5–5.2)
ALP SERPL-CCNC: 111 U/L — SIGNIFICANT CHANGE UP (ref 30–115)
ALT FLD-CCNC: 11 U/L — SIGNIFICANT CHANGE UP (ref 0–41)
ANION GAP SERPL CALC-SCNC: 11 MMOL/L — SIGNIFICANT CHANGE UP (ref 7–14)
AST SERPL-CCNC: 17 U/L — SIGNIFICANT CHANGE UP (ref 0–41)
BILIRUB SERPL-MCNC: 0.4 MG/DL — SIGNIFICANT CHANGE UP (ref 0.2–1.2)
BUN SERPL-MCNC: 19 MG/DL — SIGNIFICANT CHANGE UP (ref 10–20)
CALCIUM SERPL-MCNC: 10 MG/DL — SIGNIFICANT CHANGE UP (ref 8.5–10.1)
CHLORIDE SERPL-SCNC: 100 MMOL/L — SIGNIFICANT CHANGE UP (ref 98–110)
CO2 SERPL-SCNC: 29 MMOL/L — SIGNIFICANT CHANGE UP (ref 17–32)
CREAT SERPL-MCNC: 0.6 MG/DL — LOW (ref 0.7–1.5)
GLUCOSE SERPL-MCNC: 99 MG/DL — SIGNIFICANT CHANGE UP (ref 70–99)
HCT VFR BLD CALC: 36.3 % — LOW (ref 37–47)
HGB BLD-MCNC: 12 G/DL — SIGNIFICANT CHANGE UP (ref 12–16)
MAGNESIUM SERPL-MCNC: 1.9 MG/DL — SIGNIFICANT CHANGE UP (ref 1.8–2.4)
MCHC RBC-ENTMCNC: 30.5 PG — SIGNIFICANT CHANGE UP (ref 27–31)
MCHC RBC-ENTMCNC: 33.1 G/DL — SIGNIFICANT CHANGE UP (ref 32–37)
MCV RBC AUTO: 92.4 FL — SIGNIFICANT CHANGE UP (ref 81–99)
NRBC # BLD: 0 /100 WBCS — SIGNIFICANT CHANGE UP (ref 0–0)
PLATELET # BLD AUTO: 276 K/UL — SIGNIFICANT CHANGE UP (ref 130–400)
POTASSIUM SERPL-MCNC: 4.2 MMOL/L — SIGNIFICANT CHANGE UP (ref 3.5–5)
POTASSIUM SERPL-SCNC: 4.2 MMOL/L — SIGNIFICANT CHANGE UP (ref 3.5–5)
PROT SERPL-MCNC: 7.4 G/DL — SIGNIFICANT CHANGE UP (ref 6–8)
RBC # BLD: 3.93 M/UL — LOW (ref 4.2–5.4)
RBC # FLD: 12.8 % — SIGNIFICANT CHANGE UP (ref 11.5–14.5)
SODIUM SERPL-SCNC: 140 MMOL/L — SIGNIFICANT CHANGE UP (ref 135–146)
TROPONIN T SERPL-MCNC: <0.01 NG/ML — SIGNIFICANT CHANGE UP
WBC # BLD: 5.38 K/UL — SIGNIFICANT CHANGE UP (ref 4.8–10.8)
WBC # FLD AUTO: 5.38 K/UL — SIGNIFICANT CHANGE UP (ref 4.8–10.8)

## 2019-08-20 PROCEDURE — 99285 EMERGENCY DEPT VISIT HI MDM: CPT

## 2019-08-20 PROCEDURE — 93010 ELECTROCARDIOGRAM REPORT: CPT

## 2019-08-20 PROCEDURE — 71046 X-RAY EXAM CHEST 2 VIEWS: CPT | Mod: 26

## 2019-08-20 NOTE — ED PROVIDER NOTE - NS ED ROS FT
Constitutional: See HPI.  Eyes: No visual changes, eye pain or discharge. No Photophobia  ENMT: No hearing changes, pain, discharge or infections. No neck pain or stiffness. No limited ROM  Cardiac: +palpitations. No SOB or edema. No chest pain with exertion.  Respiratory: No cough or respiratory distress. No hemoptysis.   GI: No nausea, vomiting, diarrhea or abdominal pain.  : No dysuria, frequency or burning. No Discharge  MS: No myalgia, muscle weakness, joint pain or back pain.  Neuro: No headache or weakness. No LOC.  Skin: No skin rash.  Except as documented in the HPI, all other systems are negative.

## 2019-08-20 NOTE — ED PROVIDER NOTE - CLINICAL SUMMARY MEDICAL DECISION MAKING FREE TEXT BOX
Patient presents with palpitations, hx of svt. labs, ekg, cxr done. Patient discharged with cardiology follow up.

## 2019-08-20 NOTE — ED PROVIDER NOTE - CARE PROVIDER_API CALL
Jim Pappas (MD)  Cardiovascular Disease; Internal Medicine; Interventional Cardiology  [address] [place] [address]  Phone: [phone]  Fax: [phone]  Follow Up Time:

## 2019-08-20 NOTE — ED ADULT NURSE NOTE - OBJECTIVE STATEMENT
pt presents to ED c/o mid chest pain x2 days, associated with palpitations x 2 days, Hx of SVT, denies SOB, denies back pain.

## 2019-08-20 NOTE — ED PROVIDER NOTE - OBJECTIVE STATEMENT
48 y/o F hx of SVT s/p 2 ablations in [id number] by  p/w palpitations. Pt states 3 days of palpitations, intermittent, lasting 5-10 mins without associated chest pain but discomfort 2/2 palpitations. Called Dr. Crenshaw office for appointment but sts she wanted medical evaluation. denies any shortness of breath, n/v, sweats.

## 2019-08-20 NOTE — ED ADULT NURSE NOTE - NSHOSCREENINGQ1_ED_ALL_ED
Initiate Treatment: Triamcinolone 0.1% cream twice daily\\nKeflex 500mg BID x 7 days
Detail Level: Detailed
No

## 2019-08-20 NOTE — ED ADULT NURSE NOTE - PMH
Arrhythmia  svt  2011  Cancer  rectal  GERD (gastroesophageal reflux disease)    SVT (supraventricular tachycardia)

## 2019-08-20 NOTE — ED PROVIDER NOTE - PHYSICAL EXAMINATION
Vital Signs: I have reviewed the initial vital signs.  Constitutional: well-nourished, appears stated age, no acute distress  Cardiovascular: regular rate, regular rhythm, well-perfused extremities  Respiratory: unlabored respiratory effort, clear to auscultation bilaterally  Gastrointestinal: soft, non-tender abdomen, no pulsatile mass  Musculoskeletal: supple neck, no lower extremity edema  Integumentary: warm, dry, no rash  Neurologic: awake, alert, extremities’ motor and sensory functions grossly intact  Psychiatric: appropriate mood, appropriate affect

## 2019-08-27 PROBLEM — I47.1 SUPRAVENTRICULAR TACHYCARDIA: Chronic | Status: ACTIVE | Noted: 2019-08-20

## 2019-08-30 ENCOUNTER — APPOINTMENT (OUTPATIENT)
Dept: CARDIOLOGY | Facility: CLINIC | Age: 49
End: 2019-08-30
Payer: COMMERCIAL

## 2019-08-30 VITALS
BODY MASS INDEX: 28.88 KG/M2 | SYSTOLIC BLOOD PRESSURE: 133 MMHG | DIASTOLIC BLOOD PRESSURE: 88 MMHG | HEIGHT: 63 IN | OXYGEN SATURATION: 98 % | WEIGHT: 163 LBS | HEART RATE: 70 BPM

## 2019-08-30 PROCEDURE — 93000 ELECTROCARDIOGRAM COMPLETE: CPT

## 2019-08-30 PROCEDURE — 99204 OFFICE O/P NEW MOD 45 MIN: CPT

## 2019-08-30 NOTE — PHYSICAL EXAM
[Normal Conjunctiva] : the conjunctiva exhibited no abnormalities [Eyelids - No Xanthelasma] : the eyelids demonstrated no xanthelasmas [No Oral Pallor] : no oral pallor [Normal Oral Mucosa] : normal oral mucosa [No Oral Cyanosis] : no oral cyanosis [Normal Jugular Venous A Waves Present] : normal jugular venous A waves present [Normal Jugular Venous V Waves Present] : normal jugular venous V waves present [Respiration, Rhythm And Depth] : normal respiratory rhythm and effort [No Jugular Venous Max A Waves] : no jugular venous max A waves [Exaggerated Use Of Accessory Muscles For Inspiration] : no accessory muscle use [Heart Rate And Rhythm] : heart rate and rhythm were normal [Auscultation Breath Sounds / Voice Sounds] : lungs were clear to auscultation bilaterally [Murmurs] : no murmurs present [Heart Sounds] : normal S1 and S2 [Abdomen Tenderness] : non-tender [Abdomen Soft] : soft [Nail Clubbing] : no clubbing of the fingernails [Abdomen Mass (___ Cm)] : no abdominal mass palpated [Petechial Hemorrhages (___cm)] : no petechial hemorrhages [Cyanosis, Localized] : no localized cyanosis [Skin Color & Pigmentation] : normal skin color and pigmentation [Skin Lesions] : no skin lesions [] : no rash [No Venous Stasis] : no venous stasis [No Skin Ulcers] : no skin ulcer [No Xanthoma] : no  xanthoma was observed [Oriented To Time, Place, And Person] : oriented to person, place, and time [Affect] : the affect was normal [Mood] : the mood was normal [No Anxiety] : not feeling anxious

## 2019-08-31 NOTE — HISTORY OF PRESENT ILLNESS
[FreeTextEntry1] : Patient with history of rectal CA, SVT s/p ablation x 2 in a past Who recently  develop episodes of palpitations lasting close to an hour. By the time patient came to emergency room palpitations disappeared. Palpitations were associated with some shortness of breath. There was no chest pain nausea vomiting syncope dyspnea on exertion. Patient comes to my office before the valuation and further discussion of her symptoms \par \par \par \par \par EKG SR 63 bpm first degree

## 2019-08-31 NOTE — ASSESSMENT
[FreeTextEntry1] : palpitations - Questionable etiology of patient palpitations. To be related to SVT or other arrhythmias.\par \par -At this time I recommend event monitor evaluate patient symptoms. Have offered patient loop recorder however she wants to start with an event monitor.\par I also discussed patient possibility of ablation. We will attempt to obtain procedure note from last ablations.

## 2019-11-01 ENCOUNTER — APPOINTMENT (OUTPATIENT)
Dept: CARDIOLOGY | Facility: CLINIC | Age: 49
End: 2019-11-01
Payer: COMMERCIAL

## 2019-11-01 PROCEDURE — 93000 ELECTROCARDIOGRAM COMPLETE: CPT

## 2019-11-01 PROCEDURE — 99214 OFFICE O/P EST MOD 30 MIN: CPT

## 2019-11-01 NOTE — PHYSICAL EXAM
[Normal Conjunctiva] : the conjunctiva exhibited no abnormalities [Eyelids - No Xanthelasma] : the eyelids demonstrated no xanthelasmas [Normal Oral Mucosa] : normal oral mucosa [No Oral Pallor] : no oral pallor [No Oral Cyanosis] : no oral cyanosis [Normal Jugular Venous A Waves Present] : normal jugular venous A waves present [Normal Jugular Venous V Waves Present] : normal jugular venous V waves present [No Jugular Venous Max A Waves] : no jugular venous max A waves [Respiration, Rhythm And Depth] : normal respiratory rhythm and effort [Exaggerated Use Of Accessory Muscles For Inspiration] : no accessory muscle use [Auscultation Breath Sounds / Voice Sounds] : lungs were clear to auscultation bilaterally [Heart Rate And Rhythm] : heart rate and rhythm were normal [Heart Sounds] : normal S1 and S2 [Murmurs] : no murmurs present [Abdomen Soft] : soft [Abdomen Tenderness] : non-tender [Abdomen Mass (___ Cm)] : no abdominal mass palpated [Nail Clubbing] : no clubbing of the fingernails [Cyanosis, Localized] : no localized cyanosis [Petechial Hemorrhages (___cm)] : no petechial hemorrhages [Skin Color & Pigmentation] : normal skin color and pigmentation [] : no rash [No Venous Stasis] : no venous stasis [Skin Lesions] : no skin lesions [No Skin Ulcers] : no skin ulcer [No Xanthoma] : no  xanthoma was observed [Oriented To Time, Place, And Person] : oriented to person, place, and time [Affect] : the affect was normal [Mood] : the mood was normal [No Anxiety] : not feeling anxious

## 2019-11-01 NOTE — ASSESSMENT
[FreeTextEntry1] : palpitations - Questionable etiology of patient palpitations. To be related to SVT or other arrhythmias.\par \par - AT found on event monitor\par - will try BB fo rnow\par - discussed ablation but will try medical menagment first

## 2019-11-01 NOTE — HISTORY OF PRESENT ILLNESS
[FreeTextEntry1] : Patient with history of rectal CA, SVT s/p ablation x 2 in a past Who recently  develop episodes of palpitations lasting close to an hour. By the time patient came to emergency room palpitations disappeared. Palpitations were associated with some shortness of breath. There was no chest pain nausea vomiting syncope dyspnea on exertion. Patient comes to my office before the valuation and further discussion of her symptoms \par \par \par \par \par EKG SR 62 bpm first degree

## 2019-11-19 ENCOUNTER — FORM ENCOUNTER (OUTPATIENT)
Age: 49
End: 2019-11-19

## 2019-11-20 ENCOUNTER — OUTPATIENT (OUTPATIENT)
Dept: OUTPATIENT SERVICES | Facility: HOSPITAL | Age: 49
LOS: 1 days | Discharge: HOME | End: 2019-11-20
Payer: COMMERCIAL

## 2019-11-20 DIAGNOSIS — Z98.890 OTHER SPECIFIED POSTPROCEDURAL STATES: Chronic | ICD-10-CM

## 2019-11-20 DIAGNOSIS — I47.1 SUPRAVENTRICULAR TACHYCARDIA: ICD-10-CM

## 2019-11-20 DIAGNOSIS — R00.2 PALPITATIONS: ICD-10-CM

## 2019-11-20 PROCEDURE — 78452 HT MUSCLE IMAGE SPECT MULT: CPT | Mod: 26

## 2019-12-17 ENCOUNTER — APPOINTMENT (OUTPATIENT)
Dept: HEMATOLOGY ONCOLOGY | Facility: CLINIC | Age: 49
End: 2019-12-17

## 2020-03-06 ENCOUNTER — APPOINTMENT (OUTPATIENT)
Dept: CARDIOLOGY | Facility: CLINIC | Age: 50
End: 2020-03-06
Payer: COMMERCIAL

## 2020-03-06 VITALS
DIASTOLIC BLOOD PRESSURE: 76 MMHG | HEART RATE: 65 BPM | SYSTOLIC BLOOD PRESSURE: 113 MMHG | BODY MASS INDEX: 32.78 KG/M2 | HEIGHT: 63 IN | WEIGHT: 185 LBS

## 2020-03-06 PROCEDURE — 99214 OFFICE O/P EST MOD 30 MIN: CPT

## 2020-03-06 PROCEDURE — 93000 ELECTROCARDIOGRAM COMPLETE: CPT

## 2020-03-06 NOTE — HISTORY OF PRESENT ILLNESS
[FreeTextEntry1] : Patient with history of rectal CA, SVT s/p ablation x 2 in a past Who recently  develop episodes of palpitations lasting close to an hour. By the time patient came to emergency room palpitations disappeared. Palpitations were associated with some shortness of breath. There was no chest pain nausea vomiting syncope dyspnea on exertion. Patient comes to my office before the valuation and further discussion of her symptoms \par \par \par EKG SR 65 bpm first degree\par \par Nuc 11/19\par Impression: \par 1. Negative stress test for exercise induced ischemia with respect to symptoms at a low workload. \par 2. Negative stress test for exercise induced ischemia with respect to electrocardiographic changes at a low workload. \par 3. Myocardial imaging reveals no evidence of ischemia or infarction\par \par

## 2020-03-06 NOTE — ASSESSMENT
[FreeTextEntry1] : palpitations - Questionable etiology of patient palpitations. To be related to SVT or other arrhythmias.\par \par - AT found on event monitor\par - BB significant  improvement \par - will Contin with  medical management

## 2020-05-04 ENCOUNTER — LABORATORY RESULT (OUTPATIENT)
Age: 50
End: 2020-05-04

## 2020-05-04 ENCOUNTER — OUTPATIENT (OUTPATIENT)
Dept: OUTPATIENT SERVICES | Facility: HOSPITAL | Age: 50
LOS: 1 days | Discharge: HOME | End: 2020-05-04

## 2020-05-04 ENCOUNTER — APPOINTMENT (OUTPATIENT)
Dept: HEMATOLOGY ONCOLOGY | Facility: CLINIC | Age: 50
End: 2020-05-04
Payer: COMMERCIAL

## 2020-05-04 VITALS
TEMPERATURE: 97.7 F | RESPIRATION RATE: 14 BRPM | BODY MASS INDEX: 33.84 KG/M2 | HEIGHT: 63 IN | DIASTOLIC BLOOD PRESSURE: 85 MMHG | HEART RATE: 69 BPM | WEIGHT: 191 LBS | SYSTOLIC BLOOD PRESSURE: 143 MMHG

## 2020-05-04 DIAGNOSIS — Z98.890 OTHER SPECIFIED POSTPROCEDURAL STATES: Chronic | ICD-10-CM

## 2020-05-04 LAB
ALBUMIN SERPL ELPH-MCNC: 4.3 G/DL
ALP BLD-CCNC: 103 U/L
ALT SERPL-CCNC: 12 U/L
ANION GAP SERPL CALC-SCNC: 19 MMOL/L
AST SERPL-CCNC: 19 U/L
BILIRUB SERPL-MCNC: 0.5 MG/DL
BUN SERPL-MCNC: 11 MG/DL
CALCIUM SERPL-MCNC: 10.1 MG/DL
CHLORIDE SERPL-SCNC: 101 MMOL/L
CO2 SERPL-SCNC: 23 MMOL/L
CREAT SERPL-MCNC: 0.7 MG/DL
GLUCOSE SERPL-MCNC: 131 MG/DL
HCT VFR BLD CALC: 38.9 %
HGB BLD-MCNC: 13 G/DL
MCHC RBC-ENTMCNC: 30.7 PG
MCHC RBC-ENTMCNC: 33.4 G/DL
MCV RBC AUTO: 92 FL
PLATELET # BLD AUTO: 284 K/UL
PMV BLD: 8.8 FL
POTASSIUM SERPL-SCNC: 4.2 MMOL/L
PROT SERPL-MCNC: 7.4 G/DL
RBC # BLD: 4.23 M/UL
RBC # FLD: 12.4 %
SODIUM SERPL-SCNC: 143 MMOL/L
WBC # FLD AUTO: 6.12 K/UL

## 2020-05-04 PROCEDURE — 99213 OFFICE O/P EST LOW 20 MIN: CPT

## 2020-05-04 NOTE — HISTORY OF PRESENT ILLNESS
[Day: ___] : Day: [unfilled] [de-identified] : Recto sigmoid adenocarcinoma [de-identified] : 47 year old female, who was having watery diarrhea for couple of months associated with some blood Per rectum on wiping. No naren blood. denies abdominal pain or rectal pain. Associated with weight loss of about 20 lbs over 6 months.\par Following which she had elective colonoscopy and EGD in 9th December 2016, EGD showed normal esophagus and duodenum with erythematous mucosa in the antrum, the pathology of which was chronic gastritis.\par \par Colonoscopy (12/9/2016): External grade 3 hemorrhoids with polyp in the sigmoid colon, 15 cm from the entry site, polypectomy was not done and another sessile polyp in the ascending colon, which was removed by snare polypectomy. 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 \par On 01/16/17, she underwent colonoscopy and was found to have 3 cm frond like/villous, ulcerated and friable mass lesion in the sigmoid colon at 20 cm from entry site, which was oozing blood. Polypectomy was amenable and biopsy was taken. Multiple biopsies were taken. It was suspicious for adenocarcinoma. \par \par Pathology: Fragments of adenocarcinoma, at least intramucosal with foci highly suspicious of invasion. No mismatch repair present.\par \par 01/20/17: Underwent CT abdomen/pelvis: Rectosigmoid  mass,  measuring  5.5  cm  CC,  consistent  with  index  lesion,  with  lower  tumor  margin  approximately  8  cm  from  anal  verge,  within  mid-rectum.  \par   Perisigmoid  partially  necrotic  1.9  x  1.7  cm  lymph  node. 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  Right  adrenal  2  cm  nodule,  indeterminate  on  postcontrast  CT,  however  likely  represents  adenoma;\par \par She was then evaluated by surgical oncology Dr Syed, who after seeing the imaging studies, thought the lesion is more rectal and thought the patient would benefit from receiving neoadjuvant chemoRT and provided there is a good response, can achieve R0 resection.\par MRI Rectum was ordered, which is due on 01/27 and PET CT 01/30.  \par \par The patient is here to discuss her options of chemotherapy, chemoRT and further plan of care.\par \par At this time today, patient does have watery diarrhea and blood DC on wiping. Denies abdominal pain. Weight loss of about 20 lbs in past 6 months. on and off vomiting, denies nausea or fever, chills or rigors. [de-identified] : CEA 3.4, CA 19-9: 66.9,  : 17 [de-identified] : CBC: 12.8, platelets: 428, wbc: 6.5\par LFT's: wnl\par Craetinine: 0.65\par Chest x ray: no evidence of acute pulmonary disease. [FreeTextEntry1] : 5FU bolus was stopped due to neutropenia.  She completed 8 cycles of Neoadjuvant FOLFOX and Completed Chemoradiation on 8/21/17. She started Adjuvant Capecitabine on January 2018.\par Completed 3/3 adjuvant capecitabine cycles  around 3/2018 [de-identified] : March 22nd 2017\par Patient returns for followup today. Since her last visit she underwent an MRI Of the rectum on 1/27/2017 that showed Polypoid rectal mass which straddles the anterior peritoneal reflection  with associated perirectal adenopathy. STAGE:  T3c N1 Mx. Necrotic perirectal lymph node noted on reference CT corresponds to a 1.9 x\par 1.7 cm left adnexal cyst on MRI.\par She also underwent a PET/CT scan In February 2, 2017 and demonstrated Intense pathologic FDG uptake (SUV 25.8 ) coregistering with rectosigmoid mass\par consistent with documented biologic tumor activity. One FDG avid (SUV 8.1) 1.1 x 1.3 cm perirectal lymph node . No other sites of abnormal FDG uptake.\par \par She was presented at our tumor board. Another discussion and review of data we decided to proceed with  neoadjuvant chemotherapy with FOLFOX for 4 months with every 2 months assessments of MRI this is to follow with chemoradiation and than TME.\par A port has been placed. She started FOLFOX and currently presents Prior to cycle 3. Her length of medication so far has been mild intermittent neuropathy and neutropenia and required delaying chemotherapy as well as discontinuation of 5-FU bolus and 3 days of Neupogen.\par \par She has no other complaints.\par \par 4/19/17\par She is doing well except for some nausea for which  Compazine helps. She had an MRI of Rectum after 4 cycles  that showed  4/10/17:  decreased size of the polypoid rectal mass, now predominantly above the peritoneal reflection measuring 3.6 cm in length,\par previously 4.8 cm.\par \par 5/17/17 \par She presents for follow up today. She is doing well. She does have fatigue, no neuropathy. She is loosing her job and disibility and bills may be a problem she is in contact with our social workers.\par \par 6/7/17\par She presents for follow up. Only complaint is fatuge. Her last cycle was delayde due to neutropenia. We dropped the dose of 5FU and Oxoli by 20% and added neupogen for 3 days.  Counts from today still show mild neutropenia. \par \par 7/10/17\par She presents for follow up today. She had MRI rectum that unfortunately showed increase in her tumor. (6/29/17: .  Since April 10, 2017, increased size of polypoid rectal mass, which now straddles the peritoneal reflection measuring 5.6 (previously 3.6cm) and increased perirectal adenopathy; desmoplastic reaction limits evaluation for\par primary tumor staging.\par  She was seen by Dr. Macias and he she was recommended to start chemoradiation. She is Doing well otherwise blood is having a hard time passing stool I recommended she continues with laxatives.\par \par 7/31/17\par Patient comes in for follow up visit, she is on day 12 of 28 of chemoradiation, patient has been having burning and itching in the vaginal and perineal areas patient tried monistat with no relief, she then tried hydrocortisone which worsened the burning. The patient also states that she has been having diarrhea, 12 episodes a day, patient has not been having good intake and is not drinking a lot of water. The patient also endorses nausea and vomiting, she states that the zofran does not help her and makes her more nauseous and the compazine makes her sleepy. Patient also states she is weaker and more tired. Her counts have slowly been trending down due to radiation. \par \par 8/7/17\par Patient comes in today for follow up, feels weak and tired, states that she is still having diarrhea but it has improved. She was prescribed immodium on the last visit which she took, but it made her constipated and when she stopped taking it, there was severe diarrhea. The patient also has nausea with some mild vomiting and dry heaves. The patient also states that she has no appetite and is not eating much. The patient continues to have burning and pruritis in the perineal and vaginal areas and was prescribed aquaphor cream by radiation with little relief. \par \par 8/14/17\par Still has 8-12 bowel movements and describes it as diarrhea, some are formed but mainly liquid but not so much.  She stopped Imodium since it made her not go at all. Still has 6 days of chemoradiation. Dizzy rarely. Drinks 16-32 oz of gatorade. Ucx was normal.\par \par 8/31/17\par She feels much better now. Skin feels better and is eating. \par \par \par 10/11/17\par She is her for follow up. She had a MR of rectum 10/2/17 that showed  Since June 29, 2017, decreased size polypoid rectal mass now measuring 4.5\par cm (previously 5.6 cm), with overall unchanged perirectal adenopathy;\par desmoplastic reaction limits evaluation for primary tumor staging.   New 0.9 cm enhancing lesion in the left iliac bone, suspicious for osseous\par metastasis; further evaluation with nuclear medicine bone scan may be of use.\par I reviewed the images with Dr. Clark and the bone finding on MR has been there on all the previous MR and have not changed in size but did enhance more. He though it may have been even a cyst. He also recommended a bone scan  to make sure. She had a Bone scan on 10/10/17 No evidence for metastatic bone disease. Specifically, no abnormal radiotracer uptake within left iliac bone.\par \par She feels well otherwise and she scheduled to undergo surgery  on October 23. Her PET/CT scan is still pending.\par \par \par 11/16/17\par She is here for follow up. She underwent  PET CT scan on 10/13/17 that did not show any distant mets. She than a underwent robotic assisted low anterior resection on October 23, 2017. 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 It was R0 resection. \par \par She was presented in our tumor board and further adjuvant chemotherapy was recommended. She saw Dr. Collins who recommended capecitabine 1250 mg/m2 bid\par \par She recovered well from surgery. She has numnbess in her feet from previous oxalipaltin. Gabapentin  is not helping.\par \par 12/4/17\par She is here for follow up.  She was seen in [facility] and they recommneded capecitabine 1500 mg BID 2 weeks on and 1 week off for 3 cycles. Given her weight loss and her bowel movements not normalizing they recommended to hold capecitabine for now, have her see PMD, see physical therapy and to have restaging PET/CT and MR Pelvis prior to restarting adjuvant chemotherapy.\par \par She does feel depressed but that is because she cant pay her bills. She did not want to see pshychiatrist. She also has a cough, dry for 4 weeks but did have a URI about 4 weeks ago. No other complaints.\par \par 1/8/18\par She is here for follow up. She had a PET CT 12/19/18 that most likely showed post surgical changes and an MR is pending next week for conformation. Emotionally she is better. She has gained weight. \par \par 1/30/18\par She is here for follow up. She had the MR of pelvis on 1/12/18: Status post lower anterior rectal resection, without MRI evidence of tumor recurrence. She was started on her 1st of 3 cycles of Capecitabine.  She developed nausea and diarrhea on her day 15 of capecitabine and stopped. She though she needs to take 21 days on and 7 days off. She only took 14 days. The nausea and vomiting and diarrhea resolve and may have been due to a viral illness. \par \par \par 2/21/18\par She is here for follow up. She is to start cycle 3 of 3 of capecitabine but has had a URI so has her foster daughter. She was seen by PMD reports negative flu and CXR and is now on Zpack. We decided to start the cape once she recovers.  She is complaining of hemorrhoids and wishes to see the Surgeon.\par \par 4/11/18\par She is doing well. Recently had a hemeroidectomy by Dr. Lopez and recovered well. She has no complaints. She completed her adjuvant cape on 3/2018\par \par 7/3/18\par She is here for follow up. Recently since about 6/22/18 she has been light headed, not dizzy. It does not happen every day. she denies any bleeding weight is stable. She had blood work from 6/2018 by PMD it was reviewed just mild decrease WBC with mild decrease in lymphs normal hgb, and platelets.\par \par 6/4/19\par She is here for follow up. She has not see un since 7/2018.  She had CT C/A/P in 7/2018 and was [facility]I. She had a colonoscopy on  5/24/19 that showed  Polyp (6 mm) in the sigmoid colon. (Polypectomy).   Polyp (8 mm) in the sigmoid colon. (Polypectomy).   Moderate diverticulosis of the the left side of the colon.  Previously placed tattoo was seen around the anastomotic site.   Internal and external hemorrhoids. Path was benign.  Repeat colonoscopy  as per  Dr Scott\par \par 5/4/2020\par Patient is here for a follow-up visit for rectal cancer.  She is feeling well with no new complaints.  Most recent CBC is stable.  Patient denies fever, chills, nausea, vomiting, new pain or bleeding.  \par CT C/A/P (6.22.2019) No evidence for intrathoracic metastatic disease. Abdomen pelvis:  Interval minimal improvement of presacral post-therapy changes including minimally decreased presacral tissue  thickening, fluid and air.  No definite evidence of local tumor recurrence.  No enlarged abdominal or pelvic lymph nodes.  Stable bilateral adrenal gland adenomas.  \par She follows with Dr. Jose for SVTs. She had a colonoscopy in Nov 2019 by Dr Keating that was normal. For last 1 month she has had increase in stool frequency 2 semiformed stools a day.  She has gained weight.

## 2020-05-04 NOTE — PHYSICAL EXAM
[Fully active, able to carry on all pre-disease performance without restriction] : Status 0 - Fully active, able to carry on all pre-disease performance without restriction [Obese] : obese [Normal] : affect appropriate [Mucositis] : no mucositis [Thrush] : no thrush [Vesicles] : no vesicles [de-identified] : No tenderness in LLQ

## 2020-05-04 NOTE — REVIEW OF SYSTEMS
[Negative] : Heme/Lymph [Fever] : no fever [Chills] : no chills [Night Sweats] : no night sweats [Fatigue] : no fatigue [Cough] : no cough [Recent Change In Weight] : ~T no recent weight change [SOB on Exertion] : no shortness of breath during exertion [Abdominal Pain] : abdominal pain [Vomiting] : no vomiting [Diarrhea] : no diarrhea [FreeTextEntry7] : Increase stool frequency, mild LLQ pain for 24 hours [de-identified] : No complaints today

## 2020-05-04 NOTE — CONSULT LETTER
[Dear  ___] : Dear  [unfilled], [Please see my note below.] : Please see my note below. [Courtesy Letter:] : I had the pleasure of seeing your patient, [unfilled], in my office today. [Sincerely,] : Sincerely, [FreeTextEntry3] : Cale

## 2020-05-04 NOTE — ASSESSMENT
[Curative] : Goals of care discussed with patient: Curative [FreeTextEntry1] : 50  year old  female with clinical T3CN1 rectosigmoid adenocarcinoma on neoadjuvant chemotherapy with FOLFOX  s/p 8 cycles  then to be followed by chemoradiation than TME. MRI showed response after cycle 4 but progression post cycle 8 surgery was not recommended t and she   completed   Chemoradiation 8/21/17.  Her CEA normalized but post treatment MR rectum showed response to tumor but a   a 0.9 cm enhancing lesion in the left iliac bone was found.  All are MRIs were reviewed and apparently that lesion has been there from the beginning but was not enhancing. It is not seen on the CAT scan. Bone scan was negative. Thus was felt to be a benign finding, and enhancement near the due to treatment effect. Her PET was negative  She than a underwent robotic assisted low anterior resection on October 23, 2017. 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 It was R0 resection.  She completed 3 cycles of Capecitabine on 3/2018\par \par She has gained weight, BMI is now 33 will speak to her about weight loss necx visit,\par \par Cough \par resolved.\par \par She has increase stool frequency \par \par \par \par Plan:\par -Will check CBC CMP, CEA  and restaging CT C/A/P  today, if CAMI RTC in 6 months\par -she will see me in 6\par - Repeat Colonoscopy Dr. Keating, last was in Nov 2019\par -will call with results

## 2020-05-04 NOTE — REASON FOR VISIT
[Follow-Up Visit] : a follow-up [Family Member] : family member [FreeTextEntry2] : Recto-Sigmoid Adenocarcinoma

## 2020-05-04 NOTE — RESULTS/DATA
[FreeTextEntry1] : Check 5 on Selected Components for PET CT WHOLE BODY SKULL BASE TO MID- \par     TEST: PET CT WHOLE BODY SKULL BASE TO MID-\par  \par     Collected Date & Time: 2017 13:36\par  \par        \par     Result Name Results Units Reference Range \par      PET CT WHOLE BODY SKULL BASE TO MID-   SEE TEXT       \par     Patient Name: IVANNA NIEVES : 1970\par Patient ID: [id number]\par Account: [id number]\par Patient Location: [facility]\par Accession: [id number]\par Procedure: PET CT WHOLE BODY SKULL BASE TO MID-THIGH SUBSEQUENT [id number]\par Date of Exam: 2017 01:36 PM\par Attending Physician: GIULIANA REBOLLEDO\par Requesting Physician: GIULIANA REBOLLEDO MD\par ivanna nieves\par FDG PET CT STUDY, SUBSEQUENT TREATMENT STRATEGY\par REASON: TUMOR IMAGING - PET with concurrently acquired CT for attenuation\par correction and anatomic localization; skull base to mid - thigh /\par 16917/colorectal carcinoma, subsequent treatment strategy\par ICD10M code:C 21;C 18.9\par HISTORY: This a  47 -year-old patient with rectosigmoid adenocarcinoma\par diagnosed 2017 status post chemoradiation therapy and underwent \par robotic\par assisted low anterior resection 10/23/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 \par T3\par N1b-R0 resection\par FDG PET CT study 10/13/2017 compared  showed no new definite sites of\par pathologic FDG uptake; anatomically decreased size of FDG avid mid to high\par rectal mass approximately 4 cm in length with SUV 16.9 vs 25.8; focal mildly\par increased uptake in 0.6 cm subcutaneous lesion on the left back (SUV 3) likely\par infectious or inflammatory; interval resolution of previously described FDG \par avid\par perirectal lymph node; no abnormal radiotracer uptake at the site of left \par iliac\par focus of increased enhancement on MRI (negative bone scan October 10, 2017)\par CT of abdomen and pelvis 2017 showed presacral fluid and air which\par may be postoperative change\par Last chemotherapy and radiation therapy 2017\par  Blood glucose pre injection 108 mg/dL\par TECHNIQUE: Approximately 45 minutes after the intravenous administration of\par 12.5 mCi 18-Fluorine FDG, whole body PET images were acquired from base of \par skull\par to mid - thigh. In addition, non-contrast, low dose, non - diagnostic CT was\par acquired for attenuation correction and anatomic correlation purposes only.\par These images reveal compared to 10/13/2017, a new finding of abnormal FDG\par uptake (SUV 5.6) in the presacral region coregistering with air collection \par with\par peripheral fluid/phlegmonous change on review with body imaging. The sacral \par air\par and fluid collection has improved compared to 2017..\par Previous FDG avid rectal mass has been surgically resected.\par Previous focus of uptake in the left back subcutaneous lesion is no longer\par present and probably represented inflammatory uptake at that time\par No other sites of abnormal FDG uptake. The site of MRI abnormality in the left\par iliac bone is again non-FDG avid\par IMPRESSION:\par Compared to 10/13/2017, a new finding of abnormal FDG uptake (SUV 5.6) in the\par presacral region coregistering with air collection with peripheral fluid and\par edema - (on CT, the presacral air collection and edema has improved compared \par to\par 10/31/2017) ..\par Previous FDG avid rectal mass has been surgically resected.\par The site of MRI abnormality in the left iliac bone is again non-FDG avid\par No other sites of abnormal FDG uptake\par RIOS BAUTISTA D.O., M.M.M, C.P.E.\par Spoke with GIULIANA REBOLLEDO MD on 2017 3:28 PM with readback. We\par discussed the fact that it is unclear whether this represents postoperative\par change, less likely inflammatory uptake versus tumor recurrence. The patient \par is\par already being scheduled for repeat MRI, it may be that the MRI will help to\par differentiate between recurrence and post operative change\par I the attending attest that I have personally reviewed these images and agree\par with this report.\par Original final report dictated and signed by Dr. Rios Bautista and Dr. vickey Velazquez\par [place] on 2017 03:33 PM\par  \par

## 2020-05-05 LAB — CEA SERPL-MCNC: 2.2 NG/ML

## 2020-05-11 ENCOUNTER — OUTPATIENT (OUTPATIENT)
Dept: OUTPATIENT SERVICES | Facility: HOSPITAL | Age: 50
LOS: 1 days | Discharge: HOME | End: 2020-05-11
Payer: COMMERCIAL

## 2020-05-11 ENCOUNTER — RESULT REVIEW (OUTPATIENT)
Age: 50
End: 2020-05-11

## 2020-05-11 DIAGNOSIS — C20 MALIGNANT NEOPLASM OF RECTUM: ICD-10-CM

## 2020-05-11 DIAGNOSIS — Z98.890 OTHER SPECIFIED POSTPROCEDURAL STATES: Chronic | ICD-10-CM

## 2020-05-11 PROCEDURE — 71260 CT THORAX DX C+: CPT | Mod: 26

## 2020-05-11 PROCEDURE — 74177 CT ABD & PELVIS W/CONTRAST: CPT | Mod: 26

## 2020-05-18 DIAGNOSIS — C20 MALIGNANT NEOPLASM OF RECTUM: ICD-10-CM

## 2021-05-02 ENCOUNTER — TRANSCRIPTION ENCOUNTER (OUTPATIENT)
Age: 51
End: 2021-05-02

## 2021-05-06 ENCOUNTER — TRANSCRIPTION ENCOUNTER (OUTPATIENT)
Age: 51
End: 2021-05-06

## 2021-05-12 ENCOUNTER — OUTPATIENT (OUTPATIENT)
Dept: OUTPATIENT SERVICES | Facility: HOSPITAL | Age: 51
LOS: 1 days | Discharge: HOME | End: 2021-05-12
Payer: COMMERCIAL

## 2021-05-12 DIAGNOSIS — D35.00 BENIGN NEOPLASM OF UNSPECIFIED ADRENAL GLAND: ICD-10-CM

## 2021-05-12 DIAGNOSIS — Z98.890 OTHER SPECIFIED POSTPROCEDURAL STATES: Chronic | ICD-10-CM

## 2021-05-12 DIAGNOSIS — R53.83 OTHER FATIGUE: ICD-10-CM

## 2021-05-12 PROCEDURE — 74177 CT ABD & PELVIS W/CONTRAST: CPT | Mod: 26

## 2021-05-18 ENCOUNTER — APPOINTMENT (OUTPATIENT)
Dept: HEMATOLOGY ONCOLOGY | Facility: CLINIC | Age: 51
End: 2021-05-18
Payer: COMMERCIAL

## 2021-05-18 VITALS
SYSTOLIC BLOOD PRESSURE: 140 MMHG | DIASTOLIC BLOOD PRESSURE: 82 MMHG | WEIGHT: 185 LBS | BODY MASS INDEX: 32.78 KG/M2 | HEIGHT: 63 IN | RESPIRATION RATE: 16 BRPM | TEMPERATURE: 97.9 F | HEART RATE: 81 BPM

## 2021-05-18 PROCEDURE — 99213 OFFICE O/P EST LOW 20 MIN: CPT

## 2021-05-19 LAB — CANCER AG19-9 SERPL-ACNC: 83 U/ML

## 2021-05-21 NOTE — CONSULT LETTER
[Dear  ___] : Dear  [unfilled], [Courtesy Letter:] : I had the pleasure of seeing your patient, [unfilled], in my office today. [Please see my note below.] : Please see my note below. [Sincerely,] : Sincerely, [FreeTextEntry3] : Acle

## 2021-05-21 NOTE — PHYSICAL EXAM
[Fully active, able to carry on all pre-disease performance without restriction] : Status 0 - Fully active, able to carry on all pre-disease performance without restriction [Obese] : obese [Normal] : affect appropriate [Mucositis] : no mucositis [Thrush] : no thrush [Vesicles] : no vesicles [de-identified] : No tenderness in LLQ

## 2021-05-21 NOTE — REVIEW OF SYSTEMS
[Negative] : Heme/Lymph [Recent Change In Weight] : ~T recent weight change [Fatigue] : fatigue [Fever] : no fever [Chills] : no chills [Night Sweats] : no night sweats [Cough] : no cough [SOB on Exertion] : no shortness of breath during exertion [Abdominal Pain] : no abdominal pain [Vomiting] : no vomiting [Diarrhea] : no diarrhea [FreeTextEntry2] : 4lb weight loss over 1 week, poor appetite [FreeTextEntry7] : reports PRBRB 3x in last week [de-identified] : No complaints today

## 2021-05-21 NOTE — END OF VISIT
[FreeTextEntry3] : I was physically present for the key portions of the evaluation and management service provided.  I agree with the history and physical, and plan which I have reviewed and edited where appropriate.\par \par She as found to have an elevated CA 19-9. CT Abd/pelvis was CAMI. We will check CT chest and repeat CA 19-9 if 19-9 still elevated and CT chest is CAMI  will  bring her back in 3-4 weeks from today and repeat CA 19-9 if still high and or trending up will check PET/CT. She is also scheduled to undergo a colonoscopy.

## 2021-05-21 NOTE — ASSESSMENT
[Curative] : Goals of care discussed with patient: Curative [FreeTextEntry1] : 51  year old  female with clinical T3CN1 rectosigmoid adenocarcinoma on neoadjuvant chemotherapy with FOLFOX  s/p 8 cycles  then to be followed by chemoradiation then TME.  MRI showed response after cycle 4 but progression post cycle 8 surgery was not recommended and she completed Chemoradiation 8/21/17.  Her CEA normalized but post treatment MR rectum showed response to tumor but a 0.9 cm enhancing lesion in the left iliac bone was found.  All are MRIs were reviewed and apparently that lesion has been there from the beginning but was not enhancing. It is not seen on the CAT scan. Bone scan was negative. Thus was felt to be a benign finding, and enhancement near the due to treatment effect. Her PET was negative  She than a underwent robotic assisted low anterior resection on October 23, 2017. 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 It was R0 resection.  She completed 3 cycles of Capecitabine on 3/2018\par - she had colonoscopy with Dr. Scott in May 2019, and then repeated by Colonoscopy Dr. Keating, last was in Nov 2019\par -  if she continues to experience unexplained rectal bleeding, she knows to follow with GI for workup and possibly repeat colonoscopy\par \par #Elevated Ca19-9, noted on labwork from 04.2021\par - explained that this marker is non-specific and is typically ordered with specific biopsy proven malignancy; it is possible to be elevated from non-malignant etiology as well including but not limited to inflammation, bronchiectasis, etc.\par - CT A/P from 05/2021 did not show any findings\par - will obtain CT Chest with IVC to make sure CAMI\par - will repeat tumor marker today\par \par RTC pending CT chest and repeat tumor markers.

## 2021-05-21 NOTE — RESULTS/DATA
[FreeTextEntry1] : Check 5 on Selected Components for PET CT WHOLE BODY SKULL BASE TO MID- \par     TEST: PET CT WHOLE BODY SKULL BASE TO MID-\par  \par     Collected Date & Time: 2017 13:36\par  \par        \par     Result Name Results Units Reference Range \par      PET CT WHOLE BODY SKULL BASE TO MID-   SEE TEXT       \par     Patient Name: IVANNA NIEVES : 1970\par Patient ID: [id number]\par Account: [id number]\par Patient Location: [facility]\par Accession: [id number]\par Procedure: PET CT WHOLE BODY SKULL BASE TO MID-THIGH SUBSEQUENT [id number]\par Date of Exam: 2017 01:36 PM\par Attending Physician: GIULIANA REBOLLEDO\par Requesting Physician: GIULIANA REBOLLEDO MD\par ivanna nieves\par FDG PET CT STUDY, SUBSEQUENT TREATMENT STRATEGY\par REASON: TUMOR IMAGING - PET with concurrently acquired CT for attenuation\par correction and anatomic localization; skull base to mid - thigh /\par 92123/colorectal carcinoma, subsequent treatment strategy\par ICD10M code:C 21;C 18.9\par HISTORY: This a  47 -year-old patient with rectosigmoid adenocarcinoma\par diagnosed 2017 status post chemoradiation therapy and underwent \par robotic\par assisted low anterior resection 10/23/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 \par T3\par N1b-R0 resection\par FDG PET CT study 10/13/2017 compared  showed no new definite sites of\par pathologic FDG uptake; anatomically decreased size of FDG avid mid to high\par rectal mass approximately 4 cm in length with SUV 16.9 vs 25.8; focal mildly\par increased uptake in 0.6 cm subcutaneous lesion on the left back (SUV 3) likely\par infectious or inflammatory; interval resolution of previously described FDG \par avid\par perirectal lymph node; no abnormal radiotracer uptake at the site of left \par iliac\par focus of increased enhancement on MRI (negative bone scan October 10, 2017)\par CT of abdomen and pelvis 2017 showed presacral fluid and air which\par may be postoperative change\par Last chemotherapy and radiation therapy 2017\par  Blood glucose pre injection 108 mg/dL\par TECHNIQUE: Approximately 45 minutes after the intravenous administration of\par 12.5 mCi 18-Fluorine FDG, whole body PET images were acquired from base of \par skull\par to mid - thigh. In addition, non-contrast, low dose, non - diagnostic CT was\par acquired for attenuation correction and anatomic correlation purposes only.\par These images reveal compared to 10/13/2017, a new finding of abnormal FDG\par uptake (SUV 5.6) in the presacral region coregistering with air collection \par with\par peripheral fluid/phlegmonous change on review with body imaging. The sacral \par air\par and fluid collection has improved compared to 2017..\par Previous FDG avid rectal mass has been surgically resected.\par Previous focus of uptake in the left back subcutaneous lesion is no longer\par present and probably represented inflammatory uptake at that time\par No other sites of abnormal FDG uptake. The site of MRI abnormality in the left\par iliac bone is again non-FDG avid\par IMPRESSION:\par Compared to 10/13/2017, a new finding of abnormal FDG uptake (SUV 5.6) in the\par presacral region coregistering with air collection with peripheral fluid and\par edema - (on CT, the presacral air collection and edema has improved compared \par to\par 10/31/2017) ..\par Previous FDG avid rectal mass has been surgically resected.\par The site of MRI abnormality in the left iliac bone is again non-FDG avid\par No other sites of abnormal FDG uptake\par RIOS BAUTISTA D.O., M.M.M, C.P.E.\par Spoke with GIULIANA REBOLLEDO MD on 2017 3:28 PM with readback. We\par discussed the fact that it is unclear whether this represents postoperative\par change, less likely inflammatory uptake versus tumor recurrence. The patient \par is\par already being scheduled for repeat MRI, it may be that the MRI will help to\par differentiate between recurrence and post operative change\par I the attending attest that I have personally reviewed these images and agree\par with this report.\par Original final report dictated and signed by Dr. Rios Bautista and Dr. vickey Velazquez\par Zl[place]ower on 2017 03:33 PM\par  \par

## 2021-06-03 ENCOUNTER — OUTPATIENT (OUTPATIENT)
Dept: OUTPATIENT SERVICES | Facility: HOSPITAL | Age: 51
LOS: 1 days | Discharge: HOME | End: 2021-06-03
Payer: COMMERCIAL

## 2021-06-03 ENCOUNTER — RESULT REVIEW (OUTPATIENT)
Age: 51
End: 2021-06-03

## 2021-06-03 DIAGNOSIS — Z98.890 OTHER SPECIFIED POSTPROCEDURAL STATES: Chronic | ICD-10-CM

## 2021-06-03 DIAGNOSIS — C20 MALIGNANT NEOPLASM OF RECTUM: ICD-10-CM

## 2021-06-03 PROCEDURE — 71260 CT THORAX DX C+: CPT | Mod: 26

## 2021-06-29 ENCOUNTER — APPOINTMENT (OUTPATIENT)
Dept: HEMATOLOGY ONCOLOGY | Facility: CLINIC | Age: 51
End: 2021-06-29
Payer: COMMERCIAL

## 2021-06-29 ENCOUNTER — OUTPATIENT (OUTPATIENT)
Dept: OUTPATIENT SERVICES | Facility: HOSPITAL | Age: 51
LOS: 1 days | Discharge: HOME | End: 2021-06-29

## 2021-06-29 ENCOUNTER — LABORATORY RESULT (OUTPATIENT)
Age: 51
End: 2021-06-29

## 2021-06-29 VITALS — HEIGHT: 63 IN | WEIGHT: 191 LBS | BODY MASS INDEX: 33.84 KG/M2

## 2021-06-29 DIAGNOSIS — R97.8 OTHER ABNORMAL TUMOR MARKERS: ICD-10-CM

## 2021-06-29 DIAGNOSIS — Z98.890 OTHER SPECIFIED POSTPROCEDURAL STATES: Chronic | ICD-10-CM

## 2021-06-29 DIAGNOSIS — C20 MALIGNANT NEOPLASM OF RECTUM: ICD-10-CM

## 2021-06-29 LAB
HCT VFR BLD CALC: 34.1 %
HGB BLD-MCNC: 11.5 G/DL
MCHC RBC-ENTMCNC: 31 PG
MCHC RBC-ENTMCNC: 33.7 G/DL
MCV RBC AUTO: 91.9 FL
PLATELET # BLD AUTO: 237 K/UL
PMV BLD: 9 FL
RBC # BLD: 3.71 M/UL
RBC # FLD: 12.7 %
WBC # FLD AUTO: 6.36 K/UL

## 2021-06-29 PROCEDURE — 99213 OFFICE O/P EST LOW 20 MIN: CPT

## 2021-06-29 NOTE — RESULTS/DATA
[FreeTextEntry1] : Check 5 on Selected Components for PET CT WHOLE BODY SKULL BASE TO MID- \par     TEST: PET CT WHOLE BODY SKULL BASE TO MID-\par  \par     Collected Date & Time: 2017 13:36\par  \par        \par     Result Name Results Units Reference Range \par      PET CT WHOLE BODY SKULL BASE TO MID-   SEE TEXT       \par     Patient Name: IVANNA NIEVES : 1970\par Patient ID: [id number]\par Account: [id number]\par Patient Location: [facility]\par Accession: [id number]\par Procedure: PET CT WHOLE BODY SKULL BASE TO MID-THIGH SUBSEQUENT [id number]\par Date of Exam: 2017 01:36 PM\par Attending Physician: GIULIANA REBOLLEDO\par Requesting Physician: GIULIANA REBOLLEDO MD\par ivanna nieves\par FDG PET CT STUDY, SUBSEQUENT TREATMENT STRATEGY\par REASON: TUMOR IMAGING - PET with concurrently acquired CT for attenuation\par correction and anatomic localization; skull base to mid - thigh /\par 85808/colorectal carcinoma, subsequent treatment strategy\par ICD10M code:C 21;C 18.9\par HISTORY: This a  47 -year-old patient with rectosigmoid adenocarcinoma\par diagnosed 2017 status post chemoradiation therapy and underwent \par robotic\par assisted low anterior resection 10/23/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 \par T3\par N1b-R0 resection\par FDG PET CT study 10/13/2017 compared  showed no new definite sites of\par pathologic FDG uptake; anatomically decreased size of FDG avid mid to high\par rectal mass approximately 4 cm in length with SUV 16.9 vs 25.8; focal mildly\par increased uptake in 0.6 cm subcutaneous lesion on the left back (SUV 3) likely\par infectious or inflammatory; interval resolution of previously described FDG \par avid\par perirectal lymph node; no abnormal radiotracer uptake at the site of left \par iliac\par focus of increased enhancement on MRI (negative bone scan October 10, 2017)\par CT of abdomen and pelvis 2017 showed presacral fluid and air which\par may be postoperative change\par Last chemotherapy and radiation therapy 2017\par  Blood glucose pre injection 108 mg/dL\par TECHNIQUE: Approximately 45 minutes after the intravenous administration of\par 12.5 mCi 18-Fluorine FDG, whole body PET images were acquired from base of \par skull\par to mid - thigh. In addition, non-contrast, low dose, non - diagnostic CT was\par acquired for attenuation correction and anatomic correlation purposes only.\par These images reveal compared to 10/13/2017, a new finding of abnormal FDG\par uptake (SUV 5.6) in the presacral region coregistering with air collection \par with\par peripheral fluid/phlegmonous change on review with body imaging. The sacral \par air\par and fluid collection has improved compared to 2017..\par Previous FDG avid rectal mass has been surgically resected.\par Previous focus of uptake in the left back subcutaneous lesion is no longer\par present and probably represented inflammatory uptake at that time\par No other sites of abnormal FDG uptake. The site of MRI abnormality in the left\par iliac bone is again non-FDG avid\par IMPRESSION:\par Compared to 10/13/2017, a new finding of abnormal FDG uptake (SUV 5.6) in the\par presacral region coregistering with air collection with peripheral fluid and\par edema - (on CT, the presacral air collection and edema has improved compared \par to\par 10/31/2017) ..\par Previous FDG avid rectal mass has been surgically resected.\par The site of MRI abnormality in the left iliac bone is again non-FDG avid\par No other sites of abnormal FDG uptake\par RIOS BAUTISTA D.O., M.M.M, C.P.E.\par Spoke with GIULIANA REBOLLEDO MD on 2017 3:28 PM with readback. We\par discussed the fact that it is unclear whether this represents postoperative\par change, less likely inflammatory uptake versus tumor recurrence. The patient \par is\par already being scheduled for repeat MRI, it may be that the MRI will help to\par differentiate between recurrence and post operative change\par I the attending attest that I have personally reviewed these images and agree\par with this report.\par Original final report dictated and signed by Dr. Rios Bautista and Dr. vickey Velazquez\par [place]ower on 2017 03:33 PM\par  \par

## 2021-06-29 NOTE — ASSESSMENT
[Curative] : Goals of care discussed with patient: Curative [FreeTextEntry1] : 51  year old  female with clinical T3CN1 rectosigmoid adenocarcinoma on neoadjuvant chemotherapy with FOLFOX  s/p 8 cycles  then to be followed by chemoradiation then TME.  MRI showed response after cycle 4 but progression post cycle 8 surgery was not recommended and she completed Chemoradiation 8/21/17.  Her CEA normalized but post treatment MR rectum showed response to tumor but a 0.9 cm enhancing lesion in the left iliac bone was found.  All are MRIs were reviewed and apparently that lesion has been there from the beginning but was not enhancing. It is not seen on the CAT scan. Bone scan was negative. Thus was felt to be a benign finding, and enhancement near the due to treatment effect. Her PET was negative  She than a underwent robotic assisted low anterior resection on October 23, 2017. 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 It was R0 resection.  She completed 3 cycles of Capecitabine on 3/2018\par - she had colonoscopy with Dr. Scott in May 2019, and then repeated by Colonoscopy Dr. Keating, last was in Nov 2019\par - since CT imaging does not reveal specific etiology for her gradually rising Ca19-9, we discussed sending her to GI for workup including EGD + repeat colonoscopy +/- endoscopic ultrasound\par \par #Elevated Ca19-9, noted on labwork from 04.2021\par - explained that this marker is non-specific and is typically ordered with specific biopsy proven malignancy; it is possible to be elevated from non-malignant etiology as well including but not limited to inflammation, bronchiectasis, etc.\par - Ca19-9 gradually rising, up to 83 from last month\par - CT A/P from 05/2021 did not show any findings\par - CT Chest from 06/2021 did not show any findings\par - will repeat tumor marker today\par - if marker continues to rise, we agreed to order PET/CT to further investigate + will send to GI for workup including EGD + repeat colonoscopy +/- endoscopic ultrasound as well\par \par #persistent 0.7 cm faint ovoid density in the 10:00 right breast noted on Mammomgram from 06/2021\par - unlikely this is the cause of the rising Ca19-9; increased serum carbohydrate antigen (CA) 19-9 is a quite uncommon manifestation of breast cancer in early disease based on literature review but possible\par - she will proceed with biopsy to r/o malignancy\par \par We will call with results and arrange followup accordingly.

## 2021-06-29 NOTE — HISTORY OF PRESENT ILLNESS
[de-identified] : 47 year old female, who was having watery diarrhea for couple of months associated with some blood Per rectum on wiping. No naren blood. denies abdominal pain or rectal pain. Associated with weight loss of about 20 lbs over 6 months.\par Following which she had elective colonoscopy and EGD in 9th December 2016, EGD showed normal esophagus and duodenum with erythematous mucosa in the antrum, the pathology of which was chronic gastritis.\par \par Colonoscopy (12/9/2016): External grade 3 hemorrhoids with polyp in the sigmoid colon, 15 cm from the entry site, polypectomy was not done and another sessile polyp in the ascending colon, which was removed by snare polypectomy. 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 \par On 01/16/17, she underwent colonoscopy and was found to have 3 cm frond like/villous, ulcerated and friable mass lesion in the sigmoid colon at 20 cm from entry site, which was oozing blood. Polypectomy was amenable and biopsy was taken. Multiple biopsies were taken. It was suspicious for adenocarcinoma. \par \par Pathology: Fragments of adenocarcinoma, at least intramucosal with foci highly suspicious of invasion. No mismatch repair present.\par \par 01/20/17: Underwent CT abdomen/pelvis: Rectosigmoid  mass,  measuring  5.5  cm  CC,  consistent  with  index  lesion,  with  lower  tumor  margin  approximately  8  cm  from  anal  verge,  within  mid-rectum.  \par   Perisigmoid  partially  necrotic  1.9  x  1.7  cm  lymph  node. 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  Right  adrenal  2  cm  nodule,  indeterminate  on  postcontrast  CT,  however  likely  represents  adenoma;\par \par She was then evaluated by surgical oncology Dr Syed, who after seeing the imaging studies, thought the lesion is more rectal and thought the patient would benefit from receiving neoadjuvant chemoRT and provided there is a good response, can achieve R0 resection.\par MRI Rectum was ordered, which is due on 01/27 and PET CT 01/30.  \par \par The patient is here to discuss her options of chemotherapy, chemoRT and further plan of care.\par \par At this time today, patient does have watery diarrhea and blood NV on wiping. Denies abdominal pain. Weight loss of about 20 lbs in past 6 months. on and off vomiting, denies nausea or fever, chills or rigors. [de-identified] : Recto sigmoid adenocarcinoma [de-identified] : CEA 3.4, CA 19-9: 66.9,  : 17 [de-identified] : CBC: 12.8, platelets: 428, wbc: 6.5\par LFT's: wnl\par Craetinine: 0.65\par Chest x ray: no evidence of acute pulmonary disease. [FreeTextEntry1] : 5FU bolus was stopped due to neutropenia.  She completed 8 cycles of Neoadjuvant FOLFOX and Completed Chemoradiation on 8/21/17. She started Adjuvant Capecitabine on January 2018.\par Completed 3/3 adjuvant capecitabine cycles  around 3/2018 [de-identified] : March 22nd 2017\par Patient returns for followup today. Since her last visit she underwent an MRI Of the rectum on 1/27/2017 that showed Polypoid rectal mass which straddles the anterior peritoneal reflection  with associated perirectal adenopathy. STAGE:  T3c N1 Mx. Necrotic perirectal lymph node noted on reference CT corresponds to a 1.9 x\par 1.7 cm left adnexal cyst on MRI.\par She also underwent a PET/CT scan In February 2, 2017 and demonstrated Intense pathologic FDG uptake (SUV 25.8 ) coregistering with rectosigmoid mass\par consistent with documented biologic tumor activity. One FDG avid (SUV 8.1) 1.1 x 1.3 cm perirectal lymph node . No other sites of abnormal FDG uptake.\par \par She was presented at our tumor board. Another discussion and review of data we decided to proceed with  neoadjuvant chemotherapy with FOLFOX for 4 months with every 2 months assessments of MRI this is to follow with chemoradiation and than TME.\par A port has been placed. She started FOLFOX and currently presents Prior to cycle 3. Her length of medication so far has been mild intermittent neuropathy and neutropenia and required delaying chemotherapy as well as discontinuation of 5-FU bolus and 3 days of Neupogen.\par \par She has no other complaints.\par \par 4/19/17\par She is doing well except for some nausea for which  Compazine helps. She had an MRI of Rectum after 4 cycles  that showed  4/10/17:  decreased size of the polypoid rectal mass, now predominantly above the peritoneal reflection measuring 3.6 cm in length,\par previously 4.8 cm.\par \par 5/17/17 \par She presents for follow up today. She is doing well. She does have fatigue, no neuropathy. She is loosing her job and disibility and bills may be a problem she is in contact with our social workers.\par \par 6/7/17\par She presents for follow up. Only complaint is fatuge. Her last cycle was delayde due to neutropenia. We dropped the dose of 5FU and Oxoli by 20% and added neupogen for 3 days.  Counts from today still show mild neutropenia. \par \par 7/10/17\par She presents for follow up today. She had MRI rectum that unfortunately showed increase in her tumor. (6/29/17: .  Since April 10, 2017, increased size of polypoid rectal mass, which now straddles the peritoneal reflection measuring 5.6 (previously 3.6cm) and increased perirectal adenopathy; desmoplastic reaction limits evaluation for\par primary tumor staging.\par  She was seen by Dr. Macias and he she was recommended to start chemoradiation. She is Doing well otherwise blood is having a hard time passing stool I recommended she continues with laxatives.\par \par 7/31/17\par Patient comes in for follow up visit, she is on day 12 of 28 of chemoradiation, patient has been having burning and itching in the vaginal and perineal areas patient tried monistat with no relief, she then tried hydrocortisone which worsened the burning. The patient also states that she has been having diarrhea, 12 episodes a day, patient has not been having good intake and is not drinking a lot of water. The patient also endorses nausea and vomiting, she states that the zofran does not help her and makes her more nauseous and the compazine makes her sleepy. Patient also states she is weaker and more tired. Her counts have slowly been trending down due to radiation. \par \par 8/7/17\par Patient comes in today for follow up, feels weak and tired, states that she is still having diarrhea but it has improved. She was prescribed immodium on the last visit which she took, but it made her constipated and when she stopped taking it, there was severe diarrhea. The patient also has nausea with some mild vomiting and dry heaves. The patient also states that she has no appetite and is not eating much. The patient continues to have burning and pruritis in the perineal and vaginal areas and was prescribed aquaphor cream by radiation with little relief. \par \par 8/14/17\par Still has 8-12 bowel movements and describes it as diarrhea, some are formed but mainly liquid but not so much.  She stopped Imodium since it made her not go at all. Still has 6 days of chemoradiation. Dizzy rarely. Drinks 16-32 oz of gatorade. Ucx was normal.\par \par 8/31/17\par She feels much better now. Skin feels better and is eating. \par \par \par 10/11/17\par She is her for follow up. She had a MR of rectum 10/2/17 that showed  Since June 29, 2017, decreased size polypoid rectal mass now measuring 4.5\par cm (previously 5.6 cm), with overall unchanged perirectal adenopathy;\par desmoplastic reaction limits evaluation for primary tumor staging.   New 0.9 cm enhancing lesion in the left iliac bone, suspicious for osseous\par metastasis; further evaluation with nuclear medicine bone scan may be of use.\par I reviewed the images with Dr. Clark and the bone finding on MR has been there on all the previous MR and have not changed in size but did enhance more. He though it may have been even a cyst. He also recommended a bone scan  to make sure. She had a Bone scan on 10/10/17 No evidence for metastatic bone disease. Specifically, no abnormal radiotracer uptake within left iliac bone.\par \par She feels well otherwise and she scheduled to undergo surgery  on October 23. Her PET/CT scan is still pending.\par \par \par 11/16/17\par She is here for follow up. She underwent  PET CT scan on 10/13/17 that did not show any distant mets. She than a underwent robotic assisted low anterior resection on October 23, 2017. 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 It was R0 resection. \par \par She was presented in our tumor board and further adjuvant chemotherapy was recommended. She saw Dr. Collins who recommended capecitabine 1250 mg/m2 bid\par \par She recovered well from surgery. She has numnbess in her feet from previous oxalipaltin. Gabapentin  is not helping.\par \par 12/4/17\par She is here for follow up.  She was seen in [facility] and they recommneded capecitabine 1500 mg BID 2 weeks on and 1 week off for 3 cycles. Given her weight loss and her bowel movements not normalizing they recommended to hold capecitabine for now, have her see PMD, see physical therapy and to have restaging PET/CT and MR Pelvis prior to restarting adjuvant chemotherapy.\par \par She does feel depressed but that is because she cant pay her bills. She did not want to see pshychiatrist. She also has a cough, dry for 4 weeks but did have a URI about 4 weeks ago. No other complaints.\par \par 1/8/18\par She is here for follow up. She had a PET CT 12/19/18 that most likely showed post surgical changes and an MR is pending next week for conformation. Emotionally she is better. She has gained weight. \par \par 1/30/18\par She is here for follow up. She had the MR of pelvis on 1/12/18: Status post lower anterior rectal resection, without MRI evidence of tumor recurrence. She was started on her 1st of 3 cycles of Capecitabine.  She developed nausea and diarrhea on her day 15 of capecitabine and stopped. She though she needs to take 21 days on and 7 days off. She only took 14 days. The nausea and vomiting and diarrhea resolve and may have been due to a viral illness. \par \par \par 2/21/18\par She is here for follow up. She is to start cycle 3 of 3 of capecitabine but has had a URI so has her foster daughter. She was seen by PMD reports negative flu and CXR and is now on Zpack. We decided to start the cape once she recovers.  She is complaining of hemorrhoids and wishes to see the Surgeon.\par \par 4/11/18\par She is doing well. Recently had a hemeroidectomy by Dr. Lopez and recovered well. She has no complaints. She completed her adjuvant cape on 3/2018\par \par 7/3/18\par She is here for follow up. Recently since about 6/22/18 she has been light headed, not dizzy. It does not happen every day. she denies any bleeding weight is stable. She had blood work from 6/2018 by PMD it was reviewed just mild decrease WBC with mild decrease in lymphs normal hgb, and platelets.\par \par 6/4/19\par She is here for follow up. She has not see un since 7/2018.  She had CT C/A/P in 7/2018 and was [facility]. She had a colonoscopy on  5/24/19 that showed  Polyp (6 mm) in the sigmoid colon. (Polypectomy).   Polyp (8 mm) in the sigmoid colon. (Polypectomy).   Moderate diverticulosis of the the left side of the colon.  Previously placed tattoo was seen around the anastomotic site.   Internal and external hemorrhoids. Path was benign.  Repeat colonoscopy  as per  Dr Scott\par \par 5/4/2020\par Patient is here for a follow-up visit for rectal cancer.  She is feeling well with no new complaints.  Most recent CBC is stable.  Patient denies fever, chills, nausea, vomiting, new pain or bleeding.  \par CT C/A/P (6.22.2019) No evidence for intrathoracic metastatic disease. Abdomen pelvis:  Interval minimal improvement of presacral post-therapy changes including minimally decreased presacral tissue  thickening, fluid and air.  No definite evidence of local tumor recurrence.  No enlarged abdominal or pelvic lymph nodes.  Stable bilateral adrenal gland adenomas.  \par She follows with Dr. Jose for SVTs. She had a colonoscopy in Nov 2019 by Dr Keating that was normal. For last 1 month she has had increase in stool frequency 2 semiformed stools a day.  She has gained weight.\par \par 5/18/21\par Patient is here for a follow-up visit for rectal cancer.  She is feeling well with no new complaints.  Most recent CBC is stable.  Patient denies fever, chills, nausea, vomiting, new pain or bleeding.  Reviewed imaging from  last year which was [facility].  She has been anxious since her PCP checked Ca19-9 due to nonspecific fatigue.  Her most recent Ca19-9 was up to 69 from 04/28/2021.  Of note, she had a cold concurrently at the time.  She was subsequently sent for CT A/P which was reviewed and also suggestive of [facility].  She had about 3 episodes of rectal bleeding in the last week and reports weight loss over the last week as well.  \par CT C/A/P (5.11.2020) Impression:Since June 22, 2019.No current evidence of intrathoracic disease. 1.  No definite findings of recurrent or metastatic disease in the abdomen or pelvis. 2.  Postsurgical changes of low anterior resection including chronic presacral soft tissue thickening and small focus of gas, grossly similar to prior CT. It is difficult to determine if this focus of gas is intraluminal or extraluminal. Given presence over many years, it is possible this may represent a focus of intraluminal gas in a small blind ending limb, not discernable by CT, tethered to the presacral tissues - versus sequelae of anastomotic leak.\par CT A/P (5.12.2021) IMPRESSION:1.  No findings of new or increasing metastatic disease in the abdomen or pelvis. 2.  Postsurgical changes of low anterior resection. Redemonstration of presacral edema/thickening similar to slightly decreased from prior CT, likely posttreatment related. Previously described focus of gas no longer seen.\par Labwork (4.28.2021) WBC 5.3, Hgb 12.8, MCV 94, , ironsat 17, ferritin 142, TIBC 331, Cr 0.51, [id number], CEA 2.5, Ca19-9 is 69, Ca125 is 13.4, TSH 2.6\par \par 6/29/21\par Patient is here for a follow-up visit for rectal cancer.  She is feeling well with no new complaints.  Most recent Ca19-9 is up to 83U/mL.  Reviewed most recent CT Chest which shows no current CT evidence of active intrathoracic disease.  She had colonoscopy done last year in 2020 with a different GI, but she plans to follow with Dr. Scott again.  She had mammography performed at  which she is undergoing biopsy for a suspicious R breast lesion. Patient denies fever, chills, nausea, vomiting or new palpable lumps/bumps.  She had about 3 episodes of rectal bleeding in the last week and reports weight loss over the last week as well.  \par CT Chest (6.3.2021) Impression:No current CT evidence of active intrathoracic disease.\par Mammogram (6.25.2021 - )  OVERALL IMPRESSION:  Persistent 0.7 cm faint ovoid density in the 10:00 right breast, seen best on the MLO and 90 degrees lateral views. Further assessment with tomosynthesis guided stereotactic biopsy is recommended (an appointment an ultrasound for biopsy can be helpful for this patient in the event that the lesion is too superficial for stereotactic sampling) and the patient was so advised.  Biopsy of the right breast(s) is recommended. A letter will be sent to the patient to return for a biopsy.The findings and recommendations were discussed with the patient.BI-RADS 4: SUSPICIOUS - CATEGORY 4A: LOW SUSPICION FOR MALIGNANCY

## 2021-06-29 NOTE — END OF VISIT
[FreeTextEntry3] : I was physically present for the key portions of the evaluation and management service provided.  I agree with the history and physical, and plan which I have reviewed and edited where appropriate.\par \par CA 19-9 was elavated with last blood work. She did CT Chest that was [facility]. She is getting a biopsy for a breast lesion  0.7 mm and elevated CA 19-9 is not common for breast cancer but is reported in case reports. Will repeat blood work today and if 19-9 still high will check PET/CT and send her for an EGD +/- EUS and colonoscopy and follow up her breast biopsy results.

## 2021-06-29 NOTE — PHYSICAL EXAM
[Fully active, able to carry on all pre-disease performance without restriction] : Status 0 - Fully active, able to carry on all pre-disease performance without restriction [Obese] : obese [Normal] : affect appropriate [Mucositis] : no mucositis [Thrush] : no thrush [Vesicles] : no vesicles [de-identified] : No tenderness in LLQ

## 2021-06-29 NOTE — REVIEW OF SYSTEMS
[Fatigue] : fatigue [Recent Change In Weight] : ~T recent weight change [Negative] : Heme/Lymph [Fever] : no fever [Chills] : no chills [Night Sweats] : no night sweats [Cough] : no cough [SOB on Exertion] : no shortness of breath during exertion [Abdominal Pain] : no abdominal pain [Vomiting] : no vomiting [Diarrhea] : no diarrhea [FreeTextEntry2] : 4lb weight loss over 1 week, poor appetite [FreeTextEntry7] : reports PRBRB 3x in last week [de-identified] : No complaints today

## 2021-07-01 DIAGNOSIS — R97.8 OTHER ABNORMAL TUMOR MARKERS: ICD-10-CM

## 2021-07-01 DIAGNOSIS — C20 MALIGNANT NEOPLASM OF RECTUM: ICD-10-CM

## 2021-07-01 LAB
ALBUMIN SERPL ELPH-MCNC: 4.1 G/DL
ALP BLD-CCNC: 84 U/L
ALT SERPL-CCNC: 11 U/L
ANION GAP SERPL CALC-SCNC: 9 MMOL/L
AST SERPL-CCNC: 19 U/L
BILIRUB SERPL-MCNC: 0.4 MG/DL
BUN SERPL-MCNC: 11 MG/DL
CALCIUM SERPL-MCNC: 9.1 MG/DL
CANCER AG19-9 SERPL-ACNC: 55 U/ML
CEA SERPL-MCNC: 2.3 NG/ML
CHLORIDE SERPL-SCNC: 105 MMOL/L
CO2 SERPL-SCNC: 26 MMOL/L
CREAT SERPL-MCNC: 0.5 MG/DL
GLUCOSE SERPL-MCNC: 183 MG/DL
POTASSIUM SERPL-SCNC: 3.6 MMOL/L
PROT SERPL-MCNC: 6.2 G/DL
SODIUM SERPL-SCNC: 140 MMOL/L

## 2021-07-15 ENCOUNTER — RESULT REVIEW (OUTPATIENT)
Age: 51
End: 2021-07-15

## 2021-07-15 ENCOUNTER — OUTPATIENT (OUTPATIENT)
Dept: OUTPATIENT SERVICES | Facility: HOSPITAL | Age: 51
LOS: 1 days | Discharge: HOME | End: 2021-07-15
Payer: COMMERCIAL

## 2021-07-15 DIAGNOSIS — Z98.890 OTHER SPECIFIED POSTPROCEDURAL STATES: Chronic | ICD-10-CM

## 2021-07-15 DIAGNOSIS — C20 MALIGNANT NEOPLASM OF RECTUM: ICD-10-CM

## 2021-07-15 DIAGNOSIS — R97.8 OTHER ABNORMAL TUMOR MARKERS: ICD-10-CM

## 2021-07-15 LAB — GLUCOSE BLDC GLUCOMTR-MCNC: 132 MG/DL — HIGH (ref 70–99)

## 2021-07-15 PROCEDURE — 78815 PET IMAGE W/CT SKULL-THIGH: CPT | Mod: 26,PS

## 2021-09-02 ENCOUNTER — TRANSCRIPTION ENCOUNTER (OUTPATIENT)
Age: 51
End: 2021-09-02

## 2021-10-08 ENCOUNTER — APPOINTMENT (OUTPATIENT)
Dept: HEMATOLOGY ONCOLOGY | Facility: CLINIC | Age: 51
End: 2021-10-08

## 2021-10-23 ENCOUNTER — LABORATORY RESULT (OUTPATIENT)
Age: 51
End: 2021-10-23

## 2021-10-23 ENCOUNTER — OUTPATIENT (OUTPATIENT)
Dept: OUTPATIENT SERVICES | Facility: HOSPITAL | Age: 51
LOS: 1 days | Discharge: HOME | End: 2021-10-23

## 2021-10-23 DIAGNOSIS — Z98.890 OTHER SPECIFIED POSTPROCEDURAL STATES: Chronic | ICD-10-CM

## 2021-10-23 DIAGNOSIS — Z11.59 ENCOUNTER FOR SCREENING FOR OTHER VIRAL DISEASES: ICD-10-CM

## 2021-11-16 ENCOUNTER — LABORATORY RESULT (OUTPATIENT)
Age: 51
End: 2021-11-16

## 2021-12-26 ENCOUNTER — LABORATORY RESULT (OUTPATIENT)
Age: 51
End: 2021-12-26

## 2022-03-02 ENCOUNTER — EMERGENCY (EMERGENCY)
Facility: HOSPITAL | Age: 52
LOS: 0 days | Discharge: LEFT BEFORE DISCHARGE | End: 2022-03-02
Attending: EMERGENCY MEDICINE | Admitting: EMERGENCY MEDICINE
Payer: COMMERCIAL

## 2022-03-02 ENCOUNTER — TRANSCRIPTION ENCOUNTER (OUTPATIENT)
Age: 52
End: 2022-03-02

## 2022-03-02 VITALS
DIASTOLIC BLOOD PRESSURE: 87 MMHG | WEIGHT: 179.9 LBS | OXYGEN SATURATION: 99 % | TEMPERATURE: 98 F | SYSTOLIC BLOOD PRESSURE: 172 MMHG | RESPIRATION RATE: 18 BRPM | HEIGHT: 63 IN | HEART RATE: 75 BPM

## 2022-03-02 VITALS
RESPIRATION RATE: 18 BRPM | OXYGEN SATURATION: 99 % | DIASTOLIC BLOOD PRESSURE: 80 MMHG | TEMPERATURE: 98 F | SYSTOLIC BLOOD PRESSURE: 151 MMHG | HEART RATE: 67 BPM

## 2022-03-02 DIAGNOSIS — Z20.822 CONTACT WITH AND (SUSPECTED) EXPOSURE TO COVID-19: ICD-10-CM

## 2022-03-02 DIAGNOSIS — R07.89 OTHER CHEST PAIN: ICD-10-CM

## 2022-03-02 DIAGNOSIS — R06.02 SHORTNESS OF BREATH: ICD-10-CM

## 2022-03-02 DIAGNOSIS — Z53.29 PROCEDURE AND TREATMENT NOT CARRIED OUT BECAUSE OF PATIENT'S DECISION FOR OTHER REASONS: ICD-10-CM

## 2022-03-02 DIAGNOSIS — Z98.890 OTHER SPECIFIED POSTPROCEDURAL STATES: Chronic | ICD-10-CM

## 2022-03-02 DIAGNOSIS — R00.2 PALPITATIONS: ICD-10-CM

## 2022-03-02 DIAGNOSIS — C20 MALIGNANT NEOPLASM OF RECTUM: ICD-10-CM

## 2022-03-02 LAB
ALBUMIN SERPL ELPH-MCNC: 4.2 G/DL — SIGNIFICANT CHANGE UP (ref 3.5–5.2)
ALP SERPL-CCNC: 102 U/L — SIGNIFICANT CHANGE UP (ref 30–115)
ALT FLD-CCNC: 13 U/L — SIGNIFICANT CHANGE UP (ref 0–41)
ANION GAP SERPL CALC-SCNC: 14 MMOL/L — SIGNIFICANT CHANGE UP (ref 7–14)
APTT BLD: 34.9 SEC — SIGNIFICANT CHANGE UP (ref 27–39.2)
AST SERPL-CCNC: 20 U/L — SIGNIFICANT CHANGE UP (ref 0–41)
BASOPHILS # BLD AUTO: 0.02 K/UL — SIGNIFICANT CHANGE UP (ref 0–0.2)
BASOPHILS NFR BLD AUTO: 0.4 % — SIGNIFICANT CHANGE UP (ref 0–1)
BILIRUB SERPL-MCNC: 0.3 MG/DL — SIGNIFICANT CHANGE UP (ref 0.2–1.2)
BUN SERPL-MCNC: 11 MG/DL — SIGNIFICANT CHANGE UP (ref 10–20)
CALCIUM SERPL-MCNC: 9.7 MG/DL — SIGNIFICANT CHANGE UP (ref 8.5–10.1)
CHLORIDE SERPL-SCNC: 100 MMOL/L — SIGNIFICANT CHANGE UP (ref 98–110)
CO2 SERPL-SCNC: 25 MMOL/L — SIGNIFICANT CHANGE UP (ref 17–32)
CREAT SERPL-MCNC: 0.5 MG/DL — LOW (ref 0.7–1.5)
D DIMER BLD IA.RAPID-MCNC: 161 NG/ML DDU — SIGNIFICANT CHANGE UP (ref 0–230)
EGFR: 112 ML/MIN/1.73M2 — SIGNIFICANT CHANGE UP
EOSINOPHIL # BLD AUTO: 0.13 K/UL — SIGNIFICANT CHANGE UP (ref 0–0.7)
EOSINOPHIL NFR BLD AUTO: 2.5 % — SIGNIFICANT CHANGE UP (ref 0–8)
GLUCOSE SERPL-MCNC: 137 MG/DL — HIGH (ref 70–99)
HCG SERPL QL: NEGATIVE — SIGNIFICANT CHANGE UP
HCT VFR BLD CALC: 37.2 % — SIGNIFICANT CHANGE UP (ref 37–47)
HGB BLD-MCNC: 12.6 G/DL — SIGNIFICANT CHANGE UP (ref 12–16)
IMM GRANULOCYTES NFR BLD AUTO: 0.2 % — SIGNIFICANT CHANGE UP (ref 0.1–0.3)
INR BLD: 0.95 RATIO — SIGNIFICANT CHANGE UP (ref 0.65–1.3)
LYMPHOCYTES # BLD AUTO: 1.52 K/UL — SIGNIFICANT CHANGE UP (ref 1.2–3.4)
LYMPHOCYTES # BLD AUTO: 29 % — SIGNIFICANT CHANGE UP (ref 20.5–51.1)
MCHC RBC-ENTMCNC: 30.4 PG — SIGNIFICANT CHANGE UP (ref 27–31)
MCHC RBC-ENTMCNC: 33.9 G/DL — SIGNIFICANT CHANGE UP (ref 32–37)
MCV RBC AUTO: 89.6 FL — SIGNIFICANT CHANGE UP (ref 81–99)
MONOCYTES # BLD AUTO: 0.34 K/UL — SIGNIFICANT CHANGE UP (ref 0.1–0.6)
MONOCYTES NFR BLD AUTO: 6.5 % — SIGNIFICANT CHANGE UP (ref 1.7–9.3)
NEUTROPHILS # BLD AUTO: 3.23 K/UL — SIGNIFICANT CHANGE UP (ref 1.4–6.5)
NEUTROPHILS NFR BLD AUTO: 61.4 % — SIGNIFICANT CHANGE UP (ref 42.2–75.2)
NRBC # BLD: 0 /100 WBCS — SIGNIFICANT CHANGE UP (ref 0–0)
PLATELET # BLD AUTO: 253 K/UL — SIGNIFICANT CHANGE UP (ref 130–400)
POTASSIUM SERPL-MCNC: 3.9 MMOL/L — SIGNIFICANT CHANGE UP (ref 3.5–5)
POTASSIUM SERPL-SCNC: 3.9 MMOL/L — SIGNIFICANT CHANGE UP (ref 3.5–5)
PROT SERPL-MCNC: 6.9 G/DL — SIGNIFICANT CHANGE UP (ref 6–8)
PROTHROM AB SERPL-ACNC: 10.9 SEC — SIGNIFICANT CHANGE UP (ref 9.95–12.87)
RBC # BLD: 4.15 M/UL — LOW (ref 4.2–5.4)
RBC # FLD: 12.4 % — SIGNIFICANT CHANGE UP (ref 11.5–14.5)
SARS-COV-2 RNA SPEC QL NAA+PROBE: SIGNIFICANT CHANGE UP
SODIUM SERPL-SCNC: 139 MMOL/L — SIGNIFICANT CHANGE UP (ref 135–146)
TROPONIN T SERPL-MCNC: <0.01 NG/ML — SIGNIFICANT CHANGE UP
WBC # BLD: 5.25 K/UL — SIGNIFICANT CHANGE UP (ref 4.8–10.8)
WBC # FLD AUTO: 5.25 K/UL — SIGNIFICANT CHANGE UP (ref 4.8–10.8)

## 2022-03-02 PROCEDURE — 71046 X-RAY EXAM CHEST 2 VIEWS: CPT | Mod: 26

## 2022-03-02 PROCEDURE — 93010 ELECTROCARDIOGRAM REPORT: CPT

## 2022-03-02 PROCEDURE — 99285 EMERGENCY DEPT VISIT HI MDM: CPT

## 2022-03-02 NOTE — ED ADULT TRIAGE NOTE - CHIEF COMPLAINT QUOTE
Patient c/o mid sternal chest pressure  with SOB that started yesterday night and remains constant since last night. Patient states pain is present when active and at rest

## 2022-03-02 NOTE — ED PROVIDER NOTE - PROGRESS NOTE DETAILS
patient wishes to leave AMA. Patient seen at bedside to further discuss plan of care. Discussed risks of leaving against medical advice, which includes worsening of current medical condition, up to and including death. Patient understands risks and still wishes to leave. AMA paperwork signed and placed in chart. Dr. Bautista made aware.     Will continue to follow, RN to call if any changes.

## 2022-03-02 NOTE — ED PROVIDER NOTE - PHYSICAL EXAMINATION
CONSTITUTIONAL: Well-appearing; well-nourished; in no apparent distress.   EYES: PERRL; EOM intact.   ENT: normal nose; no rhinorrhea; normal pharynx with no tonsillar hypertrophy.   NECK: Supple; non-tender; no cervical lymphadenopathy.    CARDIOVASCULAR: Normal S1, S2; no murmurs, rubs, or gallops. Equal radial pulses  CHEST: No chest wall ttp  RESPIRATORY: Normal chest excursion with respiration; breath sounds clear and equal bilaterally; no wheezes, rhonchi, or rales.  GI/: Normal bowel sounds; non-distended; non-tender; no palpable organomegaly.   MS: No evidence of trauma or deformity. Normal ROM in all four extremities; non-tender to palpation; distal pulses are normal.   SKIN: Normal for age and race; warm; dry; good turgor; no apparent lesions or exudate.   Extrem: no peripheral edema. No calf ttp  NEURO/PSYCH: A & O x 4; grossly unremarkable. mood and manner are appropriate. Grooming and personal hygiene are appropriate.

## 2022-03-02 NOTE — ED PROVIDER NOTE - ATTENDING CONTRIBUTION TO CARE
60-year-old woman with history of SVT s/p ablation x 2 ([id number]), rectal CA s/p resection/chemo/radiation in ER with complaint of chest pain which started yesterday.  Patient describes heaviness to mid chest, no radiation, associated with intermittent shortness of breath.  No N/D.  No diaphoresis.  No lower extremity swelling/pain.  No abdominal pain.  No F/C.  No HA/syncope/near syncope.  PE - nad, nc/at, eomi, perrl, op - clear, mmm, neck supple, cta b/l, no w/r/r, rrr, abd- soft, nt/nd, nabs, from x 4, no LE swelling/tenderness, A&O x 3, cn 2-12 intact, no focal motor/sensory deficits.   -Cardiac work-up 51-year-old woman with history of SVT s/p ablation x 2 ([id number]), rectal CA s/p resection/chemo/radiation in ER with complaint of chest pain which started yesterday.  Patient describes heaviness to mid chest, no radiation, associated with intermittent shortness of breath.  No N/D.  No diaphoresis.  No lower extremity swelling/pain.  No abdominal pain.  No F/C.  No HA/syncope/near syncope.  PE - nad, nc/at, eomi, perrl, op - clear, mmm, neck supple, cta b/l, no w/r/r, rrr, abd- soft, nt/nd, nabs, from x 4, no LE swelling/tenderness, A&O x 3, cn 2-12 intact, no focal motor/sensory deficits.   -Cardiac work-up

## 2022-03-02 NOTE — ED PROVIDER NOTE - PATIENT PORTAL LINK FT
You can access the FollowMyHealth Patient Portal offered by [facility] by registering at the following website: [email or web address]. By joining [employer]’s FollowMyHealth portal, you will also be able to view your health information using other applications (apps) compatible with our system.

## 2022-03-02 NOTE — ED PROVIDER NOTE - CLINICAL SUMMARY MEDICAL DECISION MAKING FREE TEXT BOX
EKG with no acute ischemic changes.  Troponin/D-dimer negative.  Recommended for EDOU stay for further cardiac evaluation, however patient does not want to stay in hospital.  States she has 3 teenagers at home and cannot leave them alone.  Patient aware her work-up is incomplete and that leaving could result in death/permanent disability.  Patient understands but states she is must go home. To sign out AMA.  Patient states she will follow-up with cardiology as an outpatient.  Patient told she can return to ER at any time to continue her evaluation.

## 2022-03-02 NOTE — ED PROVIDER NOTE - OBJECTIVE STATEMENT
51 year old F with hx of SVT s/p 2 ablations [id number] with Dr. Schaffer, hx rectal ca 2017 s/p radiation/sx/chemo c/o 1 day of mid sternal chest pressure. Symptoms are mild, constant, non radiating, non pleuritic/non exertional. + assoc sob and palpitations. Sts last stress was > 2 years ago. follows with Dr. jean. Fam hx: father had CABG in his 30's. No tobacco use, recent travel, leg pain/swelling, hx of dvt/pe, syncope/lightheadedness, n/v/abd pain, diaphoresis, fever/chills/uri symptoms/cough, orhtopnea.

## 2022-03-02 NOTE — ED PROVIDER NOTE - CARE PROVIDER_API CALL
Yenny Pappas)  Cardiovascular Disease; Internal Medicine  [address] [place] [address]  Phone: [phone]  Fax: [phone]  Follow Up Time:

## 2022-03-02 NOTE — ED PROVIDER NOTE - NS ED ROS FT
Constitutional: no fever, chills, no recent weight loss, change in appetite or malaise  Eyes: no redness/discharge/pain/vision changes  ENT: no rhinorrhea/ear pain/sore throat  Cardiac: + cp, sob, palpitations. No edema.  Respiratory: No cough or respiratory distress  GI: No nausea, vomiting, diarrhea or abdominal pain.  : No dysuria, frequency, urgency or hematuria  MS: no pain to back or extremities, no loss of ROM, no weakness  Neuro: No headache or weakness. No LOC.  Skin: No skin rash.  Endocrine: No history of thyroid disease or diabetes.  Except as documented in the HPI, all other systems are negative.

## 2022-03-08 ENCOUNTER — APPOINTMENT (OUTPATIENT)
Dept: CARDIOLOGY | Facility: CLINIC | Age: 52
End: 2022-03-08
Payer: COMMERCIAL

## 2022-03-08 ENCOUNTER — RESULT CHARGE (OUTPATIENT)
Age: 52
End: 2022-03-08

## 2022-03-08 VITALS
HEART RATE: 66 BPM | WEIGHT: 186 LBS | HEIGHT: 63 IN | BODY MASS INDEX: 32.96 KG/M2 | SYSTOLIC BLOOD PRESSURE: 122 MMHG | DIASTOLIC BLOOD PRESSURE: 80 MMHG

## 2022-03-08 PROCEDURE — 99204 OFFICE O/P NEW MOD 45 MIN: CPT

## 2022-03-08 PROCEDURE — 93000 ELECTROCARDIOGRAM COMPLETE: CPT

## 2022-03-08 RX ORDER — METOPROLOL SUCCINATE 25 MG/1
25 TABLET, EXTENDED RELEASE ORAL DAILY
Qty: 90 | Refills: 3 | Status: DISCONTINUED | COMMUNITY
Start: 2019-11-01 | End: 2022-03-08

## 2022-03-08 NOTE — PHYSICAL EXAM
[Well Developed] : well developed [Well Nourished] : well nourished [No Acute Distress] : no acute distress [Normal Venous Pressure] : normal venous pressure [No Carotid Bruit] : no carotid bruit [Normal S1, S2] : normal S1, S2 [No Murmur] : no murmur [No Rub] : no rub [No Gallop] : no gallop [Clear Lung Fields] : clear lung fields [Good Air Entry] : good air entry [No Respiratory Distress] : no respiratory distress  [Soft] : abdomen soft [Non Tender] : non-tender [No Masses/organomegaly] : no masses/organomegaly [Normal Bowel Sounds] : normal bowel sounds [Normal Gait] : normal gait [No Edema] : no edema [No Cyanosis] : no cyanosis [No Clubbing] : no clubbing [No Varicosities] : no varicosities [No Rash] : no rash [No Skin Lesions] : no skin lesions [No Focal Deficits] : no focal deficits [Moves all extremities] : moves all extremities [Normal Speech] : normal speech [Alert and Oriented] : alert and oriented [Normal memory] : normal memory [General Appearance - Well Developed] : well developed [General Appearance - In No Acute Distress] : no acute distress [Normal Conjunctiva] : the conjunctiva exhibited no abnormalities [Eyelids - No Xanthelasma] : the eyelids demonstrated no xanthelasmas [Normal Jugular Venous A Waves Present] : normal jugular venous A waves present [Normal Jugular Venous V Waves Present] : normal jugular venous V waves present [Respiration, Rhythm And Depth] : normal respiratory rhythm and effort [Auscultation Breath Sounds / Voice Sounds] : lungs were clear to auscultation bilaterally [Heart Rate And Rhythm] : heart rate and rhythm were normal [Heart Sounds] : normal S1 and S2 [Murmurs] : no murmurs present [Edema] : no peripheral edema present [Abdomen Soft] : soft [Abdomen Tenderness] : non-tender [] : no hepato-splenomegaly [Abdomen Mass (___ Cm)] : no abdominal mass palpated [Nail Clubbing] : no clubbing of the fingernails [Cyanosis, Localized] : no localized cyanosis [Skin Color & Pigmentation] : normal skin color and pigmentation [No Skin Ulcers] : no skin ulcer [Oriented To Time, Place, And Person] : oriented to person, place, and time

## 2022-03-08 NOTE — PHYSICAL EXAM
[Well Developed] : well developed [Well Nourished] : well nourished [No Acute Distress] : no acute distress [Normal Venous Pressure] : normal venous pressure [No Carotid Bruit] : no carotid bruit [Normal S1, S2] : normal S1, S2 [No Murmur] : no murmur [No Rub] : no rub [No Gallop] : no gallop [Clear Lung Fields] : clear lung fields [Good Air Entry] : good air entry [No Respiratory Distress] : no respiratory distress  [Soft] : abdomen soft [Non Tender] : non-tender [No Masses/organomegaly] : no masses/organomegaly [Normal Bowel Sounds] : normal bowel sounds [Normal Gait] : normal gait [No Edema] : no edema [No Cyanosis] : no cyanosis [No Clubbing] : no clubbing [No Varicosities] : no varicosities [No Rash] : no rash [No Skin Lesions] : no skin lesions [Moves all extremities] : moves all extremities [No Focal Deficits] : no focal deficits [Normal Speech] : normal speech [Alert and Oriented] : alert and oriented [Normal memory] : normal memory [General Appearance - Well Developed] : well developed [General Appearance - In No Acute Distress] : no acute distress [Normal Conjunctiva] : the conjunctiva exhibited no abnormalities [Normal Jugular Venous A Waves Present] : normal jugular venous A waves present [Eyelids - No Xanthelasma] : the eyelids demonstrated no xanthelasmas [Normal Jugular Venous V Waves Present] : normal jugular venous V waves present [Respiration, Rhythm And Depth] : normal respiratory rhythm and effort [Auscultation Breath Sounds / Voice Sounds] : lungs were clear to auscultation bilaterally [Heart Rate And Rhythm] : heart rate and rhythm were normal [Heart Sounds] : normal S1 and S2 [Murmurs] : no murmurs present [Edema] : no peripheral edema present [Abdomen Soft] : soft [Abdomen Tenderness] : non-tender [] : no hepato-splenomegaly [Abdomen Mass (___ Cm)] : no abdominal mass palpated [Nail Clubbing] : no clubbing of the fingernails [Cyanosis, Localized] : no localized cyanosis [Skin Color & Pigmentation] : normal skin color and pigmentation [No Skin Ulcers] : no skin ulcer [Oriented To Time, Place, And Person] : oriented to person, place, and time

## 2022-03-14 NOTE — HISTORY OF PRESENT ILLNESS
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

## 2022-03-14 NOTE — DISCUSSION/SUMMARY
[Patient Low Risk] : the patient is a low surgical risk [FreeTextEntry1] : stress echo\par rv after testing

## 2022-03-22 ENCOUNTER — APPOINTMENT (OUTPATIENT)
Dept: CARDIOLOGY | Facility: CLINIC | Age: 52
End: 2022-03-22
Payer: COMMERCIAL

## 2022-03-22 DIAGNOSIS — Z01.810 ENCOUNTER FOR PREPROCEDURAL CARDIOVASCULAR EXAMINATION: ICD-10-CM

## 2022-03-22 PROCEDURE — 93351 STRESS TTE COMPLETE: CPT

## 2022-03-22 PROCEDURE — 93320 DOPPLER ECHO COMPLETE: CPT

## 2022-03-22 PROCEDURE — 93325 DOPPLER ECHO COLOR FLOW MAPG: CPT

## 2022-03-27 PROBLEM — Z01.810 ENCOUNTER FOR PRE-OPERATIVE CARDIOVASCULAR CLEARANCE: Status: ACTIVE | Noted: 2017-10-17

## 2022-04-12 ENCOUNTER — APPOINTMENT (OUTPATIENT)
Dept: CARDIOLOGY | Facility: CLINIC | Age: 52
End: 2022-04-12

## 2022-05-18 ENCOUNTER — APPOINTMENT (OUTPATIENT)
Dept: CARDIOLOGY | Facility: CLINIC | Age: 52
End: 2022-05-18
Payer: COMMERCIAL

## 2022-05-18 VITALS
WEIGHT: 188 LBS | BODY MASS INDEX: 33.31 KG/M2 | DIASTOLIC BLOOD PRESSURE: 70 MMHG | SYSTOLIC BLOOD PRESSURE: 118 MMHG | HEIGHT: 63 IN | HEART RATE: 60 BPM

## 2022-05-18 DIAGNOSIS — I47.1 SUPRAVENTRICULAR TACHYCARDIA: ICD-10-CM

## 2022-05-18 DIAGNOSIS — R42 DIZZINESS AND GIDDINESS: ICD-10-CM

## 2022-05-18 PROCEDURE — 99214 OFFICE O/P EST MOD 30 MIN: CPT

## 2022-05-18 RX ORDER — ATORVASTATIN CALCIUM 20 MG/1
20 TABLET, FILM COATED ORAL DAILY
Refills: 0 | Status: ACTIVE | COMMUNITY

## 2022-05-18 NOTE — DISCUSSION/SUMMARY
[FreeTextEntry1] : rv in 9mos\par medical therapy [Patient Low Risk] : the patient is a low surgical risk

## 2022-05-18 NOTE — HISTORY OF PRESENT ILLNESS
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

## 2022-05-18 NOTE — PHYSICAL EXAM
[Well Developed] : well developed [Well Nourished] : well nourished [No Acute Distress] : no acute distress [Normal Venous Pressure] : normal venous pressure [No Carotid Bruit] : no carotid bruit [Normal S1, S2] : normal S1, S2 [No Murmur] : no murmur [No Rub] : no rub [No Gallop] : no gallop [Clear Lung Fields] : clear lung fields [Good Air Entry] : good air entry [No Respiratory Distress] : no respiratory distress  [Soft] : abdomen soft [Non Tender] : non-tender [No Masses/organomegaly] : no masses/organomegaly [Normal Bowel Sounds] : normal bowel sounds [Normal Gait] : normal gait [No Edema] : no edema [No Cyanosis] : no cyanosis [No Clubbing] : no clubbing [No Varicosities] : no varicosities [No Rash] : no rash [No Skin Lesions] : no skin lesions [Moves all extremities] : moves all extremities [No Focal Deficits] : no focal deficits [Normal Speech] : normal speech [Alert and Oriented] : alert and oriented [Normal memory] : normal memory [General Appearance - Well Developed] : well developed [General Appearance - In No Acute Distress] : no acute distress [Normal Conjunctiva] : the conjunctiva exhibited no abnormalities [Eyelids - No Xanthelasma] : the eyelids demonstrated no xanthelasmas [Normal Jugular Venous A Waves Present] : normal jugular venous A waves present [Normal Jugular Venous V Waves Present] : normal jugular venous V waves present [Respiration, Rhythm And Depth] : normal respiratory rhythm and effort [Auscultation Breath Sounds / Voice Sounds] : lungs were clear to auscultation bilaterally [Heart Rate And Rhythm] : heart rate and rhythm were normal [Heart Sounds] : normal S1 and S2 [Murmurs] : no murmurs present [Edema] : no peripheral edema present [Abdomen Soft] : soft [Abdomen Tenderness] : non-tender [] : no hepato-splenomegaly [Abdomen Mass (___ Cm)] : no abdominal mass palpated [Nail Clubbing] : no clubbing of the fingernails [Cyanosis, Localized] : no localized cyanosis [Skin Color & Pigmentation] : normal skin color and pigmentation [No Skin Ulcers] : no skin ulcer [Oriented To Time, Place, And Person] : oriented to person, place, and time

## 2022-07-24 ENCOUNTER — EMERGENCY (EMERGENCY)
Facility: HOSPITAL | Age: 52
LOS: 0 days | Discharge: HOME | End: 2022-07-24
Attending: EMERGENCY MEDICINE | Admitting: EMERGENCY MEDICINE

## 2022-07-24 VITALS
HEART RATE: 68 BPM | WEIGHT: 149.91 LBS | HEIGHT: 63 IN | RESPIRATION RATE: 18 BRPM | OXYGEN SATURATION: 99 % | SYSTOLIC BLOOD PRESSURE: 151 MMHG | DIASTOLIC BLOOD PRESSURE: 84 MMHG | TEMPERATURE: 98 F

## 2022-07-24 DIAGNOSIS — Z98.890 OTHER SPECIFIED POSTPROCEDURAL STATES: Chronic | ICD-10-CM

## 2022-07-24 DIAGNOSIS — M54.50 LOW BACK PAIN, UNSPECIFIED: ICD-10-CM

## 2022-07-24 DIAGNOSIS — R11.2 NAUSEA WITH VOMITING, UNSPECIFIED: ICD-10-CM

## 2022-07-24 DIAGNOSIS — E78.5 HYPERLIPIDEMIA, UNSPECIFIED: ICD-10-CM

## 2022-07-24 LAB
ALBUMIN SERPL ELPH-MCNC: 4.4 G/DL — SIGNIFICANT CHANGE UP (ref 3.5–5.2)
ALP SERPL-CCNC: 111 U/L — SIGNIFICANT CHANGE UP (ref 30–115)
ALT FLD-CCNC: 10 U/L — SIGNIFICANT CHANGE UP (ref 0–41)
ANION GAP SERPL CALC-SCNC: 16 MMOL/L — HIGH (ref 7–14)
APPEARANCE UR: CLEAR — SIGNIFICANT CHANGE UP
AST SERPL-CCNC: 22 U/L — SIGNIFICANT CHANGE UP (ref 0–41)
BASOPHILS # BLD AUTO: 0.01 K/UL — SIGNIFICANT CHANGE UP (ref 0–0.2)
BASOPHILS NFR BLD AUTO: 0.2 % — SIGNIFICANT CHANGE UP (ref 0–1)
BILIRUB DIRECT SERPL-MCNC: <0.2 MG/DL — SIGNIFICANT CHANGE UP (ref 0–0.3)
BILIRUB INDIRECT FLD-MCNC: >0.4 MG/DL — SIGNIFICANT CHANGE UP (ref 0.2–1.2)
BILIRUB SERPL-MCNC: 0.6 MG/DL — SIGNIFICANT CHANGE UP (ref 0.2–1.2)
BILIRUB UR-MCNC: NEGATIVE — SIGNIFICANT CHANGE UP
BUN SERPL-MCNC: 14 MG/DL — SIGNIFICANT CHANGE UP (ref 10–20)
CALCIUM SERPL-MCNC: 9.8 MG/DL — SIGNIFICANT CHANGE UP (ref 8.5–10.1)
CHLORIDE SERPL-SCNC: 98 MMOL/L — SIGNIFICANT CHANGE UP (ref 98–110)
CO2 SERPL-SCNC: 22 MMOL/L — SIGNIFICANT CHANGE UP (ref 17–32)
COLOR SPEC: SIGNIFICANT CHANGE UP
CREAT SERPL-MCNC: 0.6 MG/DL — LOW (ref 0.7–1.5)
DIFF PNL FLD: NEGATIVE — SIGNIFICANT CHANGE UP
EGFR: 107 ML/MIN/1.73M2 — SIGNIFICANT CHANGE UP
EOSINOPHIL # BLD AUTO: 0.15 K/UL — SIGNIFICANT CHANGE UP (ref 0–0.7)
EOSINOPHIL NFR BLD AUTO: 3 % — SIGNIFICANT CHANGE UP (ref 0–8)
GLUCOSE SERPL-MCNC: 113 MG/DL — HIGH (ref 70–99)
GLUCOSE UR QL: NEGATIVE — SIGNIFICANT CHANGE UP
HCG SERPL QL: NEGATIVE — SIGNIFICANT CHANGE UP
HCT VFR BLD CALC: 37.5 % — SIGNIFICANT CHANGE UP (ref 37–47)
HGB BLD-MCNC: 13 G/DL — SIGNIFICANT CHANGE UP (ref 12–16)
IMM GRANULOCYTES NFR BLD AUTO: 0.4 % — HIGH (ref 0.1–0.3)
KETONES UR-MCNC: NEGATIVE — SIGNIFICANT CHANGE UP
LACTATE SERPL-SCNC: 2.7 MMOL/L — HIGH (ref 0.7–2)
LEUKOCYTE ESTERASE UR-ACNC: NEGATIVE — SIGNIFICANT CHANGE UP
LIDOCAIN IGE QN: 28 U/L — SIGNIFICANT CHANGE UP (ref 7–60)
LYMPHOCYTES # BLD AUTO: 1.71 K/UL — SIGNIFICANT CHANGE UP (ref 1.2–3.4)
LYMPHOCYTES # BLD AUTO: 34.5 % — SIGNIFICANT CHANGE UP (ref 20.5–51.1)
MCHC RBC-ENTMCNC: 30.7 PG — SIGNIFICANT CHANGE UP (ref 27–31)
MCHC RBC-ENTMCNC: 34.7 G/DL — SIGNIFICANT CHANGE UP (ref 32–37)
MCV RBC AUTO: 88.7 FL — SIGNIFICANT CHANGE UP (ref 81–99)
MONOCYTES # BLD AUTO: 0.34 K/UL — SIGNIFICANT CHANGE UP (ref 0.1–0.6)
MONOCYTES NFR BLD AUTO: 6.9 % — SIGNIFICANT CHANGE UP (ref 1.7–9.3)
NEUTROPHILS # BLD AUTO: 2.72 K/UL — SIGNIFICANT CHANGE UP (ref 1.4–6.5)
NEUTROPHILS NFR BLD AUTO: 55 % — SIGNIFICANT CHANGE UP (ref 42.2–75.2)
NITRITE UR-MCNC: NEGATIVE — SIGNIFICANT CHANGE UP
NRBC # BLD: 0 /100 WBCS — SIGNIFICANT CHANGE UP (ref 0–0)
PH UR: 5.5 — SIGNIFICANT CHANGE UP (ref 5–8)
PLATELET # BLD AUTO: 242 K/UL — SIGNIFICANT CHANGE UP (ref 130–400)
POTASSIUM SERPL-MCNC: 4.3 MMOL/L — SIGNIFICANT CHANGE UP (ref 3.5–5)
POTASSIUM SERPL-SCNC: 4.3 MMOL/L — SIGNIFICANT CHANGE UP (ref 3.5–5)
PROT SERPL-MCNC: 6.9 G/DL — SIGNIFICANT CHANGE UP (ref 6–8)
PROT UR-MCNC: NEGATIVE — SIGNIFICANT CHANGE UP
RBC # BLD: 4.23 M/UL — SIGNIFICANT CHANGE UP (ref 4.2–5.4)
RBC # FLD: 12.4 % — SIGNIFICANT CHANGE UP (ref 11.5–14.5)
SODIUM SERPL-SCNC: 136 MMOL/L — SIGNIFICANT CHANGE UP (ref 135–146)
SP GR SPEC: 1.01 — SIGNIFICANT CHANGE UP (ref 1.01–1.03)
UROBILINOGEN FLD QL: SIGNIFICANT CHANGE UP
WBC # BLD: 4.95 K/UL — SIGNIFICANT CHANGE UP (ref 4.8–10.8)
WBC # FLD AUTO: 4.95 K/UL — SIGNIFICANT CHANGE UP (ref 4.8–10.8)

## 2022-07-24 PROCEDURE — G1004: CPT

## 2022-07-24 PROCEDURE — 99285 EMERGENCY DEPT VISIT HI MDM: CPT

## 2022-07-24 PROCEDURE — 74177 CT ABD & PELVIS W/CONTRAST: CPT | Mod: 26,MG

## 2022-07-24 RX ORDER — SODIUM CHLORIDE 9 MG/ML
1000 INJECTION, SOLUTION INTRAVENOUS ONCE
Refills: 0 | Status: COMPLETED | OUTPATIENT
Start: 2022-07-24 | End: 2022-07-24

## 2022-07-24 RX ORDER — SODIUM CHLORIDE 9 MG/ML
1000 INJECTION INTRAMUSCULAR; INTRAVENOUS; SUBCUTANEOUS ONCE
Refills: 0 | Status: COMPLETED | OUTPATIENT
Start: 2022-07-24 | End: 2022-07-24

## 2022-07-24 RX ORDER — KETOROLAC TROMETHAMINE 30 MG/ML
30 SYRINGE (ML) INJECTION ONCE
Refills: 0 | Status: DISCONTINUED | OUTPATIENT
Start: 2022-07-24 | End: 2022-07-24

## 2022-07-24 RX ADMIN — SODIUM CHLORIDE 1000 MILLILITER(S): 9 INJECTION INTRAMUSCULAR; INTRAVENOUS; SUBCUTANEOUS at 07:35

## 2022-07-24 RX ADMIN — SODIUM CHLORIDE 1000 MILLILITER(S): 9 INJECTION, SOLUTION INTRAVENOUS at 10:43

## 2022-07-24 RX ADMIN — Medication 30 MILLIGRAM(S): at 08:46

## 2022-07-24 RX ADMIN — Medication 30 MILLIGRAM(S): at 00:00

## 2022-07-24 RX ADMIN — SODIUM CHLORIDE 1000 MILLILITER(S): 9 INJECTION, SOLUTION INTRAVENOUS at 09:26

## 2022-07-24 RX ADMIN — SODIUM CHLORIDE 1000 MILLILITER(S): 9 INJECTION INTRAMUSCULAR; INTRAVENOUS; SUBCUTANEOUS at 00:00

## 2022-07-24 NOTE — ED PROVIDER NOTE - OBJECTIVE STATEMENT
Patient is a 52-year-old female with a past medical history of rectal surgery here for evaluation of 3 days of intermittent right-sided lower back pain worsened when lying on it and when standing with associated nausea and vomiting earlier this morning which is what prompted visit.  Patient denies dysuria, hematuria, fever, chills, chest pain, shortness of breath, abdominal pain

## 2022-07-24 NOTE — ED PROVIDER NOTE - PHYSICAL EXAMINATION
VITAL SIGNS: I have reviewed nursing notes and confirm.  CONSTITUTIONAL: Well-developed; well-nourished; in no acute distress.   SKIN:  skin exam is warm and dry, no acute rash.    HEAD: Normocephalic; atraumatic.  EYES: conjunctiva and sclera clear.  ENT: No nasal discharge; airway clear.  CARD: S1, S2 normal; no murmurs, gallops, or rubs. Regular rate and rhythm.   RESP: No wheezes, rales or rhonchi.  ABD: Normal bowel sounds; soft; non-distended; non-tender  EXT: Normal ROM.  No clubbing, cyanosis or edema.  NEURO: Alert, oriented, grossly unremarkable

## 2022-07-24 NOTE — ED PROVIDER NOTE - CARE PROVIDER_API CALL
Faby Hudson)  Internal Medicine  [address] [place] [address]  Phone: [phone]  Fax: [phone]  Follow Up Time:

## 2022-07-24 NOTE — ED PROVIDER NOTE - ATTENDING APP SHARED VISIT CONTRIBUTION OF CARE
52-year-old female with hyperlipidemia presents to ED for 3 days of right side pain.  Worse with movement.  No nausea vomiting diarrhea constipation.  No dysuria hematuria increased frequency.    Const: NAD  Eyes: PERRL, no conjunctival injection  HENT:  Neck supple without meningismus   CV: RRR, Warm, well-perfused extremities  RESP: CTA B/L, no tachypnea   GI: soft, non-tender, non-distended, no CVA tenderness, R side tenderness to palpation  MSK: No gross deformities appreciated  Skin: Warm, dry. No rashes  Neuro: Alert, CNs II-XII grossly intact. Sensation and motor function of extremities grossly intact.  Psych: Appropriate mood and affect.    concern for musculoskeletal pain but will do labs, UA, CT to ru intraabdominal pathology

## 2022-07-24 NOTE — ED PROVIDER NOTE - PATIENT PORTAL LINK FT
You can access the FollowMyHealth Patient Portal offered by [facility] by registering at the following website: [email or web address]. By joining [facility]’s FollowMyHealth portal, you will also be able to view your health information using other applications (apps) compatible with our system.

## 2022-07-24 NOTE — ED PROVIDER NOTE - CLINICAL SUMMARY MEDICAL DECISION MAKING FREE TEXT BOX
52-year-old female presented to ED for right side pain.  Labs within normal limits-year-old normal CAT scan without demonstrate any acute pathology.  Patient's abdomen soft nontender nondistended tolerating p.o. DC home.

## 2022-07-24 NOTE — ED PROVIDER NOTE - NS ED ROS FT
Eyes:  No visual changes, eye pain or discharge.  ENMT:  No hearing changes, pain, discharge or infections. No neck pain or stiffness.  Cardiac:  No chest pain, SOB or edema  Respiratory:  No cough or respiratory distress. No hemoptysis. No history of asthma or RAD.  GI:  No nausea, vomiting, diarrhea or abdominal pain.  MS: + back pain No myalgia, muscle weakness, joint pain  Neuro:  No headache or weakness.  No LOC.  Skin:  No skin rash.   Endocrine: No history of thyroid disease or diabetes.  Except as documented in the HPI,  all other systems are negative.

## 2022-07-24 NOTE — ED PROVIDER NOTE - NS ED ATTENDING STATEMENT MOD
This was a shared visit with the CIRILO. I reviewed and verified the documentation and independently performed the documented:

## 2022-07-26 LAB
CULTURE RESULTS: SIGNIFICANT CHANGE UP
SPECIMEN SOURCE: SIGNIFICANT CHANGE UP

## 2022-08-24 ENCOUNTER — NON-APPOINTMENT (OUTPATIENT)
Age: 52
End: 2022-08-24

## 2022-09-07 ENCOUNTER — APPOINTMENT (OUTPATIENT)
Dept: GASTROENTEROLOGY | Facility: CLINIC | Age: 52
End: 2022-09-07

## 2022-09-23 ENCOUNTER — NON-APPOINTMENT (OUTPATIENT)
Age: 52
End: 2022-09-23

## 2022-10-05 ENCOUNTER — EMERGENCY (EMERGENCY)
Facility: HOSPITAL | Age: 52
LOS: 0 days | Discharge: HOME | End: 2022-10-05
Attending: EMERGENCY MEDICINE | Admitting: EMERGENCY MEDICINE

## 2022-10-05 VITALS
HEART RATE: 63 BPM | OXYGEN SATURATION: 97 % | RESPIRATION RATE: 18 BRPM | WEIGHT: 184.09 LBS | TEMPERATURE: 98 F | HEIGHT: 63 IN | DIASTOLIC BLOOD PRESSURE: 84 MMHG | SYSTOLIC BLOOD PRESSURE: 183 MMHG

## 2022-10-05 DIAGNOSIS — Z98.890 OTHER SPECIFIED POSTPROCEDURAL STATES: Chronic | ICD-10-CM

## 2022-10-05 DIAGNOSIS — M79.671 PAIN IN RIGHT FOOT: ICD-10-CM
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
## 2022-10-05 DIAGNOSIS — E11.9 TYPE 2 DIABETES MELLITUS WITHOUT COMPLICATIONS: ICD-10-CM

## 2022-10-05 DIAGNOSIS — E78.5 HYPERLIPIDEMIA, UNSPECIFIED: ICD-10-CM

## 2022-10-05 DIAGNOSIS — Z23 ENCOUNTER FOR IMMUNIZATION: ICD-10-CM

## 2022-10-05 PROCEDURE — 99283 EMERGENCY DEPT VISIT LOW MDM: CPT

## 2022-10-05 PROCEDURE — 73630 X-RAY EXAM OF FOOT: CPT | Mod: 26,RT

## 2022-10-05 RX ORDER — CEPHALEXIN 500 MG
1 CAPSULE ORAL
Qty: 20 | Refills: 0
Start: 2022-10-05 | End: 2022-10-09

## 2022-10-05 RX ORDER — TETANUS TOXOID, REDUCED DIPHTHERIA TOXOID AND ACELLULAR PERTUSSIS VACCINE, ADSORBED 5; 2.5; 8; 8; 2.5 [IU]/.5ML; [IU]/.5ML; UG/.5ML; UG/.5ML; UG/.5ML
0.5 SUSPENSION INTRAMUSCULAR ONCE
Refills: 0 | Status: COMPLETED | OUTPATIENT
Start: 2022-10-05 | End: 2022-10-05

## 2022-10-05 RX ADMIN — TETANUS TOXOID, REDUCED DIPHTHERIA TOXOID AND ACELLULAR PERTUSSIS VACCINE, ADSORBED 0.5 MILLILITER(S): 5; 2.5; 8; 8; 2.5 SUSPENSION INTRAMUSCULAR at 20:35

## 2022-10-05 NOTE — ED PROVIDER NOTE - NS ED ROS FT
Constitutional: Negative for fever  Cardiovascular: Negative for chest pain  Respiratory: Negative for SOB  Musculoskeletal: + r foot pain. Negative for back pain, neck pain, and calf cramps.

## 2022-10-05 NOTE — ED ADULT NURSE NOTE - DISCHARGE DATE/TIME
After Visit Summary   5/5/2017    Leela Dudley    MRN: [id number]           Patient Information     Date Of Birth          9/14/1928        Visit Information        Provider Department      5/5/2017 1:00 PM Hi Grande MD [facility] [place]        Today's Diagnoses     Encounter for examination of eyes and vision with abnormal findings    -  1    Presbyopia         Pseudophakia OU, with Yag OS        Choroidal nevus, right        Nonexudative senile macular degeneration of retina          Care Instructions    Glasses prescription given  Use a multiple vitamin as discussed on a daily basis or AREDS2 formula vitamins  Monitor the vision in each eye weekly - Call if any sudden persistent changes.  Wear sunglasses with UV protection in maura conditions.  Eat lots of dark leafy green vegetables.    Hi Grande MD  [phone]          Follow-ups after your visit        Follow-up notes from your care team     Return in about 1 year (around 5/5/2018) for Complete Exam.      Who to contact     If you have questions or need follow up information about today's clinic visit or your schedule please contact [facility] directly at [phone].  Normal or non-critical lab and imaging results will be communicated to you by MyChart, letter or phone within 4 business days after the clinic has received the results. If you do not hear from us within 7 days, please contact the clinic through [facility] or phone. If you have a critical or abnormal lab result, we will notify you by phone as soon as possible.  Submit refill requests through [facility] or call your pharmacy and they will forward the refill request to us. Please allow 3 business days for your refill to be completed.          Additional Information About Your Visit        MyChart Information     [facility] gives you secure access to your electronic health record. If you see a primary care provider, you can also send messages to  "your care team and make appointments. If you have questions, please call your primary care clinic.  If you do not have a primary care provider, please call [phone] and they will assist you.        Care EveryWhere ID     This is your Care EveryWhere ID. This could be used by other organizations to access your [facility] medical records  [id number]         Blood Pressure from Last 3 Encounters:   01/06/17 154/62   05/20/16 132/66   12/11/15 136/76    Weight from Last 3 Encounters:   01/06/17 59.4 kg (131 lb)   07/29/16 56.7 kg (125 lb)   05/20/16 56.7 kg (125 lb)              We Performed the Following     EYE EXAM (SIMPLE-NONBILLABLE)     REFRACTIVE STATUS        Primary Care Provider Office Phone # Fax #    Ronnie Arora [phone]       [facility] [address] [facility] [address]        Thank you!     Thank you for choosing [facility]  for your care. Our goal is always to provide you with excellent care. Hearing back from our patients is one way we can continue to improve our services. Please take a few minutes to complete the written survey that you may receive in the mail after your visit with us. Thank you!             Your Updated Medication List - Protect others around you: Learn how to safely use, store and throw away your medicines at www.disposemymeds.org.          This list is accurate as of: 5/5/17  2:15 PM.  Always use your most recent med list.                   Brand Name Dispense Instructions for use    aspirin 81 MG tablet      1 TABLET DAILY       CALTRATE 600+D PO      Take 1 tablet by mouth daily.       candesartan cilexetil 32 MG Tabs    ATACAND    90 tablet    Take 1 tablet by mouth daily for blood pressure.       diltiazem 240 MG 24 hr capsule     90 capsule    Take 1 capsule (240 mg) by mouth every evening for blood pressure.       order for DME     1 Device    Equipment being ordered: Wheelchair  Vital signs:  Height 5' 1.5\" (1.562 " m), weight 138 lb (62.596 kg)          05-Oct-2022 21:24

## 2022-10-05 NOTE — ED PROVIDER NOTE - OBJECTIVE STATEMENT
Patient presents as walk in patient with c/o nosebleed. Patient states she was seen yesterday for the same symptoms. States she was given afrin and has not had any bleeding until about 30 minutes prior to presenting this evening. Patient states she usually takes a 325 mg aspirin every morning, but she did not take that this morning. She states she wants a \"more permanent fix\" this visit. She is awake, alert, oriented, resps easy & regular, skin w/d, MMM & pink, cap refill brisk. Patient has ice pack on her neck, nose clip applied to the bridge of patient's nose. Dr. Venice oLrd in to examine patient.
52-year-old female with past medical history of diabetes and hyperlipidemia who presents with right foot pain since yesterday.  Reports that her daughter broke a photo frame and she accidentally stepped on a broken glass yesterday.  Reports that she was able to get a small piece out of her foot and thinks there is still a broken glass in there.  Endorses pain and worse with walking.  Denies any other injuries.  Unknown last tetanus shot.

## 2022-10-05 NOTE — ED PROVIDER NOTE - PROGRESS NOTE DETAILS
Xray unremarkable. Will send abx to pharmacy. Will have pt follow up with podiatry OP. Pt is stable for discharge.

## 2022-10-05 NOTE — ED PROVIDER NOTE - NSFOLLOWUPINSTRUCTIONS_ED_ALL_ED_FT
Please make sure to take the antibiotics as instructed and follow up with your primary care doctor and podiatry in 3 days.

## 2022-10-05 NOTE — ED ADULT NURSE NOTE - NSICDXPASTSURGICALHX_GEN_ALL_CORE_FT
PAST SURGICAL HISTORY:  H/O colonoscopy 2017, rectal cancer    H/O colonoscopy 10/18    History of hemorrhoidectomy     History of surgery removal rectal tumor  heart ablation x2 (2010 & 2011)  tonsillectomy  cholecystectomy

## 2022-10-05 NOTE — ED PROVIDER NOTE - PATIENT PORTAL LINK FT
You can access the FollowMyHealth Patient Portal offered by [facility] by registering at the following website: [email or web address]. By joining [facility] [facility] [employer]s FollowMyHealth portal, you will also be able to view your health information using other applications (apps) compatible with our system.

## 2022-10-05 NOTE — ED PROVIDER NOTE - CLINICAL SUMMARY MEDICAL DECISION MAKING FREE TEXT BOX
Patient evaluated for foot pain, found to have small spur on x-ray, no acute injury or FB noted.  Advise close follow-up with podiatry for reevaluation and patient agreed.  Strict return precautions advised the patient verbalized understanding.

## 2022-10-05 NOTE — ED PROVIDER NOTE - PHYSICAL EXAMINATION
CONSTITUTIONAL: Well-appearing; in no apparent distress.   ENT: Hearing is intact with good acuity to spoken voice. Patient is speaking clearly, not muffled and airway is intact.   RESPIRATORY: No signs of respiratory distress.   CARDIOVASCULAR: Regular rate and rhythm.   MS: R foot with no visible foreign body; well healed scar noticed with no erythema, drainage, or abscess noticed. Steady gait noted.  NEURO: A & O x 3. Normal speech.   PSYCHOLOGICAL: Appropriate mood and affect. Good judgement and insight.

## 2022-10-05 NOTE — ED ADULT NURSE NOTE - NSICDXPASTMEDICALHX_GEN_ALL_CORE_FT
PAST MEDICAL HISTORY:  Arrhythmia svt  2011    Cancer rectal    GERD (gastroesophageal reflux disease)     SVT (supraventricular tachycardia)

## 2023-08-18 ENCOUNTER — NON-APPOINTMENT (OUTPATIENT)
Age: 53
End: 2023-08-18

## 2023-08-29 ENCOUNTER — NON-APPOINTMENT (OUTPATIENT)
Age: 53
End: 2023-08-29

## 2023-12-18 ENCOUNTER — NON-APPOINTMENT (OUTPATIENT)
Age: 53
End: 2023-12-18

## 2023-12-21 ENCOUNTER — NON-APPOINTMENT (OUTPATIENT)
Age: 53
End: 2023-12-21

## 2024-04-01 ENCOUNTER — EMERGENCY (EMERGENCY)
Facility: HOSPITAL | Age: 54
LOS: 0 days | Discharge: HOME | End: 2024-04-02
Attending: EMERGENCY MEDICINE
Payer: COMMERCIAL

## 2024-04-01 VITALS
RESPIRATION RATE: 18 BRPM | HEART RATE: 73 BPM | WEIGHT: 186.07 LBS | DIASTOLIC BLOOD PRESSURE: 78 MMHG | SYSTOLIC BLOOD PRESSURE: 153 MMHG | OXYGEN SATURATION: 100 %

## 2024-04-01 DIAGNOSIS — Z98.890 OTHER SPECIFIED POSTPROCEDURAL STATES: Chronic | ICD-10-CM

## 2024-04-01 DIAGNOSIS — Z82.49 FAMILY HISTORY OF ISCHEMIC HEART DISEASE AND OTHER DISEASES OF THE CIRCULATORY SYSTEM: ICD-10-CM

## 2024-04-01 DIAGNOSIS — E78.5 HYPERLIPIDEMIA, UNSPECIFIED: ICD-10-CM

## 2024-04-01 DIAGNOSIS — E11.9 TYPE 2 DIABETES MELLITUS WITHOUT COMPLICATIONS: ICD-10-CM

## 2024-04-01 DIAGNOSIS — R06.02 SHORTNESS OF BREATH: ICD-10-CM

## 2024-04-01 DIAGNOSIS — R07.2 PRECORDIAL PAIN: ICD-10-CM

## 2024-04-01 DIAGNOSIS — R20.2 PARESTHESIA OF SKIN: ICD-10-CM

## 2024-04-01 DIAGNOSIS — E78.1 PURE HYPERGLYCERIDEMIA: ICD-10-CM

## 2024-04-01 DIAGNOSIS — K21.9 GASTRO-ESOPHAGEAL REFLUX DISEASE WITHOUT ESOPHAGITIS: ICD-10-CM

## 2024-04-01 DIAGNOSIS — I47.10 SUPRAVENTRICULAR TACHYCARDIA, UNSPECIFIED: ICD-10-CM

## 2024-04-01 LAB
ALBUMIN SERPL ELPH-MCNC: 4.1 G/DL — SIGNIFICANT CHANGE UP (ref 3.5–5.2)
ALP SERPL-CCNC: 104 U/L — SIGNIFICANT CHANGE UP (ref 30–115)
ALT FLD-CCNC: 12 U/L — SIGNIFICANT CHANGE UP (ref 0–41)
ANION GAP SERPL CALC-SCNC: 17 MMOL/L — HIGH (ref 7–14)
AST SERPL-CCNC: 16 U/L — SIGNIFICANT CHANGE UP (ref 0–41)
BASOPHILS # BLD AUTO: 0.03 K/UL — SIGNIFICANT CHANGE UP (ref 0–0.2)
BASOPHILS NFR BLD AUTO: 0.5 % — SIGNIFICANT CHANGE UP (ref 0–1)
BILIRUB SERPL-MCNC: 0.4 MG/DL — SIGNIFICANT CHANGE UP (ref 0.2–1.2)
BUN SERPL-MCNC: 10 MG/DL — SIGNIFICANT CHANGE UP (ref 10–20)
CALCIUM SERPL-MCNC: 9.7 MG/DL — SIGNIFICANT CHANGE UP (ref 8.4–10.4)
CHLORIDE SERPL-SCNC: 104 MMOL/L — SIGNIFICANT CHANGE UP (ref 98–110)
CO2 SERPL-SCNC: 20 MMOL/L — SIGNIFICANT CHANGE UP (ref 17–32)
CREAT SERPL-MCNC: 0.6 MG/DL — LOW (ref 0.7–1.5)
EGFR: 105 ML/MIN/1.73M2 — SIGNIFICANT CHANGE UP
EOSINOPHIL # BLD AUTO: 0.18 K/UL — SIGNIFICANT CHANGE UP (ref 0–0.7)
EOSINOPHIL NFR BLD AUTO: 2.9 % — SIGNIFICANT CHANGE UP (ref 0–8)
GLUCOSE SERPL-MCNC: 124 MG/DL — HIGH (ref 70–99)
HCT VFR BLD CALC: 37.5 % — SIGNIFICANT CHANGE UP (ref 37–47)
HGB BLD-MCNC: 13.1 G/DL — SIGNIFICANT CHANGE UP (ref 12–16)
IMM GRANULOCYTES NFR BLD AUTO: 0.3 % — SIGNIFICANT CHANGE UP (ref 0.1–0.3)
LYMPHOCYTES # BLD AUTO: 1.88 K/UL — SIGNIFICANT CHANGE UP (ref 1.2–3.4)
LYMPHOCYTES # BLD AUTO: 30.4 % — SIGNIFICANT CHANGE UP (ref 20.5–51.1)
MAGNESIUM SERPL-MCNC: 1.7 MG/DL — LOW (ref 1.8–2.4)
MCHC RBC-ENTMCNC: 31.7 PG — HIGH (ref 27–31)
MCHC RBC-ENTMCNC: 34.9 G/DL — SIGNIFICANT CHANGE UP (ref 32–37)
MCV RBC AUTO: 90.8 FL — SIGNIFICANT CHANGE UP (ref 81–99)
MONOCYTES # BLD AUTO: 0.24 K/UL — SIGNIFICANT CHANGE UP (ref 0.1–0.6)
MONOCYTES NFR BLD AUTO: 3.9 % — SIGNIFICANT CHANGE UP (ref 1.7–9.3)
NEUTROPHILS # BLD AUTO: 3.84 K/UL — SIGNIFICANT CHANGE UP (ref 1.4–6.5)
NEUTROPHILS NFR BLD AUTO: 62 % — SIGNIFICANT CHANGE UP (ref 42.2–75.2)
NRBC # BLD: 0 /100 WBCS — SIGNIFICANT CHANGE UP (ref 0–0)
NT-PROBNP SERPL-SCNC: 70 PG/ML — SIGNIFICANT CHANGE UP (ref 0–300)
PLATELET # BLD AUTO: 303 K/UL — SIGNIFICANT CHANGE UP (ref 130–400)
PMV BLD: 9.4 FL — SIGNIFICANT CHANGE UP (ref 7.4–10.4)
POTASSIUM SERPL-MCNC: 3.6 MMOL/L — SIGNIFICANT CHANGE UP (ref 3.5–5)
POTASSIUM SERPL-SCNC: 3.6 MMOL/L — SIGNIFICANT CHANGE UP (ref 3.5–5)
PROT SERPL-MCNC: 6.7 G/DL — SIGNIFICANT CHANGE UP (ref 6–8)
RBC # BLD: 4.13 M/UL — LOW (ref 4.2–5.4)
RBC # FLD: 12.4 % — SIGNIFICANT CHANGE UP (ref 11.5–14.5)
SODIUM SERPL-SCNC: 141 MMOL/L — SIGNIFICANT CHANGE UP (ref 135–146)
TROPONIN T, HIGH SENSITIVITY RESULT: 6 NG/L — SIGNIFICANT CHANGE UP (ref 6–13)
TROPONIN T, HIGH SENSITIVITY RESULT: <6 NG/L — SIGNIFICANT CHANGE UP (ref 6–13)
WBC # BLD: 6.19 K/UL — SIGNIFICANT CHANGE UP (ref 4.8–10.8)
WBC # FLD AUTO: 6.19 K/UL — SIGNIFICANT CHANGE UP (ref 4.8–10.8)

## 2024-04-01 PROCEDURE — 83880 ASSAY OF NATRIURETIC PEPTIDE: CPT

## 2024-04-01 PROCEDURE — 85025 COMPLETE CBC W/AUTO DIFF WBC: CPT

## 2024-04-01 PROCEDURE — G0378: CPT

## 2024-04-01 PROCEDURE — 93005 ELECTROCARDIOGRAM TRACING: CPT

## 2024-04-01 PROCEDURE — 75574 CT ANGIO HRT W/3D IMAGE: CPT | Mod: MC

## 2024-04-01 PROCEDURE — 93010 ELECTROCARDIOGRAM REPORT: CPT

## 2024-04-01 PROCEDURE — 84484 ASSAY OF TROPONIN QUANT: CPT

## 2024-04-01 PROCEDURE — 99285 EMERGENCY DEPT VISIT HI MDM: CPT | Mod: 25

## 2024-04-01 PROCEDURE — 71045 X-RAY EXAM CHEST 1 VIEW: CPT | Mod: 26

## 2024-04-01 PROCEDURE — 99223 1ST HOSP IP/OBS HIGH 75: CPT

## 2024-04-01 PROCEDURE — 96374 THER/PROPH/DIAG INJ IV PUSH: CPT

## 2024-04-01 PROCEDURE — 71045 X-RAY EXAM CHEST 1 VIEW: CPT

## 2024-04-01 PROCEDURE — 80053 COMPREHEN METABOLIC PANEL: CPT

## 2024-04-01 PROCEDURE — 83735 ASSAY OF MAGNESIUM: CPT

## 2024-04-01 PROCEDURE — 36415 COLL VENOUS BLD VENIPUNCTURE: CPT

## 2024-04-01 RX ORDER — ASPIRIN/CALCIUM CARB/MAGNESIUM 324 MG
324 TABLET ORAL ONCE
Refills: 0 | Status: COMPLETED | OUTPATIENT
Start: 2024-04-01 | End: 2024-04-01

## 2024-04-01 RX ORDER — SODIUM CHLORIDE 9 MG/ML
1000 INJECTION INTRAMUSCULAR; INTRAVENOUS; SUBCUTANEOUS ONCE
Refills: 0 | Status: COMPLETED | OUTPATIENT
Start: 2024-04-01 | End: 2024-04-01

## 2024-04-01 RX ORDER — MAGNESIUM SULFATE 500 MG/ML
2 VIAL (ML) INJECTION ONCE
Refills: 0 | Status: COMPLETED | OUTPATIENT
Start: 2024-04-01 | End: 2024-04-01

## 2024-04-01 RX ORDER — ATORVASTATIN CALCIUM 80 MG/1
20 TABLET, FILM COATED ORAL AT BEDTIME
Refills: 0 | Status: DISCONTINUED | OUTPATIENT
Start: 2024-04-01 | End: 2024-04-02

## 2024-04-01 RX ADMIN — SODIUM CHLORIDE 1000 MILLILITER(S): 9 INJECTION INTRAMUSCULAR; INTRAVENOUS; SUBCUTANEOUS at 18:43

## 2024-04-01 RX ADMIN — Medication 324 MILLIGRAM(S): at 14:43

## 2024-04-01 RX ADMIN — Medication 25 GRAM(S): at 18:06

## 2024-04-01 NOTE — ED PROVIDER NOTE - CONSIDERATION OF ADMISSION OBSERVATION
Consideration of Admission/Observation Patient placed in observation unit for further chest pain workup

## 2024-04-01 NOTE — ED PROVIDER NOTE - ATTENDING APP SHARED VISIT CONTRIBUTION OF CARE
54-year-old female past medical history of SVT status post ablation, GERD, diabetes, HLD presenting for evaluation of chest pain for the past 4 days.  It is left-sided, pressure-like associated with tingling in her left fingers.  No associated lightheadedness or dizziness, leg pain or swelling, change in exercise tolerance, abdominal or back pain, black or bloody stools or any other additional complaints.  Additionally, patient states that for the past several days her blood pressure readings have been elevated.  She does not take anything for hypertension.  No history of tobacco use. Does not take aspirin every day, do not take it for the last 2 days.  Well-appearing well-nourished, NAD, head AT/NC, PERRL, pink conjunctivae,  mmm, supple neck without midline spine ttp, nml work of breathing, lungs CTA b/l, equal air entry, RRR, well-perfused extremities, distal pulses intact, abdomen soft, NT/ND, BS present in all quadrants, no midline spine or CVA ttp, no leg edema or unilateral calf swelling, A&Ox3, no focal neuro deficits, nml mood and affect.  Plan: Aspirin: EKG, chest x-ray, labs, likely further chest pain workup in the observation unit if troponin is negative.  Patient is amenable to plan.  Patient had stress test and echo 2 years ago, does not recall why it was done. Cardiologist is Dr. Jim Pappas

## 2024-04-01 NOTE — ED CDU PROVIDER INITIAL DAY NOTE - CLINICAL SUMMARY MEDICAL DECISION MAKING FREE TEXT BOX
Patient placed in observation unit for further chest pain workup.  Initial troponin is 6.  Will repeat troponin, further workup in the morning.

## 2024-04-01 NOTE — ED ADULT NURSE NOTE - NSFALLUNIVINTERV_ED_ALL_ED
Bed/Stretcher in lowest position, wheels locked, appropriate side rails in place/Call bell, personal items and telephone in reach/Instruct patient to call for assistance before getting out of bed/chair/stretcher/Non-slip footwear applied when patient is off stretcher/[place] to call system/Physically safe environment - no spills, clutter or unnecessary equipment/Purposeful proactive rounding/Room/bathroom lighting operational, light cord in reach

## 2024-04-01 NOTE — ED PROVIDER NOTE - CLINICAL SUMMARY MEDICAL DECISION MAKING FREE TEXT BOX
54-year-old female with chest pain for the past few days.  EKG without acute ischemic changes, chest x-ray films independently interpreted by me ED attending as negative for other process.  Patient was given aspirin, labs were ordered and reviewed.  Troponin is 6.  Patient placed in observation unit for further chest pain workup.  Prior records were reviewed.  She is amenable to plan.

## 2024-04-01 NOTE — ED PROVIDER NOTE - OBJECTIVE STATEMENT
53 y/o female with hx DM, HDL, Elevated triglycerides, GERD, SVT presents to the ED c/o mid sternal chest pain with radiation to neck and SOB intermittently since Friday. Patient states she has been having elevated blood pressure  readings at home. Last stress test 1.5 years ago. no cough/ fever/ chills/ leg pain or leg swelling

## 2024-04-01 NOTE — ED CDU PROVIDER INITIAL DAY NOTE - OBJECTIVE STATEMENT
55 y/o female with hx DM, HDL, Elevated triglycerides, GERD, SVT presents to the ED c/o mid sternal chest pain with radiation to neck and SOB intermittently since Friday. Patient states she has been having elevated blood pressure  readings at home. Last stress test 1.5 years ago. no cough/ fever/ chills/ leg pain or leg swelling

## 2024-04-02 VITALS
RESPIRATION RATE: 17 BRPM | SYSTOLIC BLOOD PRESSURE: 122 MMHG | DIASTOLIC BLOOD PRESSURE: 70 MMHG | HEART RATE: 67 BPM | OXYGEN SATURATION: 98 %

## 2024-04-02 PROCEDURE — 99239 HOSP IP/OBS DSCHRG MGMT >30: CPT

## 2024-04-02 PROCEDURE — 75574 CT ANGIO HRT W/3D IMAGE: CPT | Mod: 26,MC

## 2024-04-02 RX ADMIN — ATORVASTATIN CALCIUM 20 MILLIGRAM(S): 80 TABLET, FILM COATED ORAL at 00:34

## 2024-04-02 NOTE — ED CDU PROVIDER DISPOSITION NOTE - NSFOLLOWUPINSTRUCTIONS_ED_ALL_ED_FT

## 2024-04-02 NOTE — ED CDU PROVIDER SUBSEQUENT DAY NOTE - HISTORY
FF: pt resting comfortably, pending ccta FF: pt is pending ccta. as per pt nurse pt keeps leaving the ED for periods of time and returning more drowsy so pt IV was removed. pt has returned to the ED. I discussed with pt nurse that she is not allowed to leave ED and around 6AM to put an 18 gauge IV because pt needs to get ccta done.

## 2024-04-02 NOTE — ED CDU PROVIDER DISPOSITION NOTE - CLINICAL COURSE
54 female presents to the ED for chest pain.  CCTA revealed CAD RADS 2.  Currently asymptomatic.  Placed on aspirin and statin.  Discharged with rapid cardiology follow-up.

## 2024-04-02 NOTE — ED CDU PROVIDER DISPOSITION NOTE - PATIENT PORTAL LINK FT
You can access the FollowMyHealth Patient Portal offered by [facility] by registering at the following website: [email or web address]. By joining [facility]’s FollowMyHealth portal, you will also be able to view your health information using other applications (apps) compatible with our system.

## 2024-04-02 NOTE — ED CDU PROVIDER DISPOSITION NOTE - CARE PROVIDER_API CALL
Kevin Panda  Internal Medicine  [address] [place] [address]  Phone: [phone]  Fax: [phone]  Follow Up Time: 1-3 Days

## 2024-04-02 NOTE — ED CDU PROVIDER SUBSEQUENT DAY NOTE - PROGRESS NOTE DETAILS
Received sign out this AM. Patient feeling well at present; denies any chest discomfort. She returned from CCTA this AM; results are pending. All labs and imaging studies reviewed with patient and she has been provided a copy. She takes a statin already - advised to continue and add ASA 81mg one daily. She will f.u with her cardiologist, Dr. Jim Pappas. Strict return precautions discussed.

## 2024-04-11 ENCOUNTER — APPOINTMENT (OUTPATIENT)
Dept: CARDIOLOGY | Facility: CLINIC | Age: 54
End: 2024-04-11
Payer: COMMERCIAL

## 2024-04-11 VITALS
HEART RATE: 61 BPM | WEIGHT: 180 LBS | HEIGHT: 63 IN | SYSTOLIC BLOOD PRESSURE: 120 MMHG | BODY MASS INDEX: 31.89 KG/M2 | DIASTOLIC BLOOD PRESSURE: 80 MMHG

## 2024-04-11 DIAGNOSIS — R94.31 ABNORMAL ELECTROCARDIOGRAM [ECG] [EKG]: ICD-10-CM

## 2024-04-11 DIAGNOSIS — R00.2 PALPITATIONS: ICD-10-CM

## 2024-04-11 DIAGNOSIS — R07.89 OTHER CHEST PAIN: ICD-10-CM

## 2024-04-11 PROCEDURE — 93000 ELECTROCARDIOGRAM COMPLETE: CPT

## 2024-04-11 PROCEDURE — 99213 OFFICE O/P EST LOW 20 MIN: CPT

## 2024-04-11 RX ORDER — CALCIUM CARBONATE/VITAMIN D3 600 MG-10
TABLET ORAL
Refills: 0 | Status: ACTIVE | COMMUNITY

## 2024-04-11 RX ORDER — PAROXETINE HYDROCHLORIDE 10 MG/1
10 TABLET, FILM COATED ORAL DAILY
Refills: 0 | Status: ACTIVE | COMMUNITY

## 2024-04-11 RX ORDER — EMPAGLIFLOZIN 10 MG/1
10 TABLET, FILM COATED ORAL DAILY
Refills: 0 | Status: ACTIVE | COMMUNITY

## 2024-04-11 RX ORDER — METFORMIN HYDROCHLORIDE 500 MG/1
500 TABLET, COATED ORAL TWICE DAILY
Refills: 0 | Status: DISCONTINUED | COMMUNITY
End: 2024-04-11

## 2024-04-11 NOTE — HISTORY OF PRESENT ILLNESS
[Preoperative Visit] : for a medical evaluation prior to surgery [Scheduled Procedure ___] : a [unfilled] [Date of Surgery ___] : on [unfilled] [Surgeon Name ___] : surgeon: [unfilled] [Stable] : Stable [FreeTextEntry1] : Patient referred to office as a follow up from [facility] er Patient c/o chest pain, non-invasive workup in ER was negaative Cardiac enzymes and EKG were normal No further test done, advised follow up as an outpatient  Since dischaarge patient denies any new cardiac complaints Still c/o atypical chest pain, no associated sob or chf symptoms No arrythmias, palps, or syncope  No significant risk factors for cad Nuclear was normal in 2019  Rectal cancer s/p chemo/RT completed 8/21 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 A coronary CTA was done in April 2024. Coronary calcium score was 0 The left main, LAD and right coronary artery were free of significant coronary obstruction.  There was a short segment of the left circumflex system with mild to moderate plaque.  Recommend Medical therapy with statin and aspirin

## 2024-04-11 NOTE — DISCUSSION/SUMMARY
[Patient Low Risk] : the patient is a low surgical risk [FreeTextEntry1] : Return visit 4 months medical therapy

## 2024-07-21 ENCOUNTER — NON-APPOINTMENT (OUTPATIENT)
Age: 54
End: 2024-07-21

## 2024-07-26 ENCOUNTER — APPOINTMENT (OUTPATIENT)
Dept: ORTHOPEDIC SURGERY | Facility: CLINIC | Age: 54
End: 2024-07-26
Payer: COMMERCIAL

## 2024-07-26 VITALS — HEIGHT: 64 IN

## 2024-07-26 DIAGNOSIS — S92.911A UNSPECIFIED FRACTURE OF RIGHT TOE(S), INITIAL ENCOUNTER FOR CLOSED FRACTURE: ICD-10-CM

## 2024-07-26 DIAGNOSIS — S99.921A UNSPECIFIED INJURY OF RIGHT FOOT, INITIAL ENCOUNTER: ICD-10-CM

## 2024-07-26 PROCEDURE — 99203 OFFICE O/P NEW LOW 30 MIN: CPT

## 2024-07-26 NOTE — PHYSICAL EXAM
[1st] : 1st [NL (40)] : plantar flexion 40 degrees [NL 30)] : inversion 30 degrees [NL (20)] : eversion 20 degrees [5___] : [facility] 5[unfilled]/5 [2+] : posterior tibialis pulse: 2+ [] : antalgic [Outside films reviewed] : Outside films reviewed [FreeTextEntry3] : Swelling over the midfoot. Swelling over the great toe and the first MTP joint. [de-identified] : That he is a mild avulsion fracture over the base of the proximal phalanx of the great toe.

## 2024-07-26 NOTE — HISTORY OF PRESENT ILLNESS
[de-identified] : 54-year-old female here for an evaluation of endorsing the right foot, patient states that he is injury happened on July 21, 2024, she fell down the last few days steps on her stairs. Patient states that she was seen in urgent center, she was advised of a fracture, she was placed in a Darco shoe and advised to follow-up with orthopedic, patient states that she has significant pain, he pain is 7/10 with ambulation, 5/10 at rest.   Past medical history diabetes, hypertension, hypercholesterolemia, depression, anxiety, hypertriglyceridemia. Patient denies any allergy to medication.

## 2024-07-26 NOTE — DISCUSSION/SUMMARY
[de-identified] : Impression: Right foot injury, questionable Lisfranc injury. Fracture over the base of the proximal phalanx of the great toe.  Plan: Patient was advised for Partial weightbearing with a cane which was given in the office today. Patient will continue using the Darco shoe. Patient was advised for ice therapy. I would like to get an MRI of the right foot to evaluate a possible Lisfranc injury due to the mechanism of fall and swelling, ecchymosis and tenderness over the Lisfranc.  Follow-up: At this time patient was advised to give me a call after the MRI gets done, so we can discuss further treatment plan. Patient will be moving to [facility] therefore no follow-up appointment was given in our office.

## 2024-07-27 ENCOUNTER — APPOINTMENT (OUTPATIENT)
Dept: MRI IMAGING | Facility: CLINIC | Age: 54
End: 2024-07-27

## 2024-07-27 PROCEDURE — 73718 MRI LOWER EXTREMITY W/O DYE: CPT | Mod: RT

## 2024-08-27 ENCOUNTER — APPOINTMENT (OUTPATIENT)
Dept: CARDIOLOGY | Facility: CLINIC | Age: 54
End: 2024-08-27

## 2024-09-06 NOTE — H&P ADULT - NSHPROSALLOTHERNEGRD_GEN_ALL_CORE
"Spoke with Loli from [facility] who stated expressed frustrations as to why a hem/onc consult appointment cannot be made for patient. I reiterated Noemi Garcia RN's note from 9/3 that stated \"we have no records supporting the referring diagnosis. Message left with Medical records stating we would need records supporting the referring diagnosis to schedule.\" Loli informed me patient is not from [facility] and was diagnosed in the 1980s so there likely isn't additional documentation.   Loli is requesting a call back today (before 4pm) to further discuss  Best call back number: [phone]  " All other review of systems negative, except as noted in HPI

## 2025-06-04 NOTE — HISTORY OF PRESENT ILLNESS
English [de-identified] : 47 year old female, who was having watery diarrhea for couple of months associated with some blood Per rectum on wiping. No naren blood. denies abdominal pain or rectal pain. Associated with weight loss of about 20 lbs over 6 months.\par Following which she had elective colonoscopy and EGD in 9th December 2016, EGD showed normal esophagus and duodenum with erythematous mucosa in the antrum, the pathology of which was chronic gastritis.\par \par Colonoscopy (12/9/2016): External grade 3 hemorrhoids with polyp in the sigmoid colon, 15 cm from the entry site, polypectomy was not done and another sessile polyp in the ascending colon, which was removed by snare polypectomy. \par Pathology: Ascending colon polyp was hyperplastic and rectosigmoid polyp was tubulovillous adenoma.\par \par She was sent in to see Dr Sctot for polypectomy of rectosigmoid lesion and EUS. \par On 01/16/17, she underwent colonoscopy and was found to have 3 cm frond like/villous, ulcerated and friable mass lesion in the sigmoid colon at 20 cm from entry site, which was oozing blood. Polypectomy was amenable and biopsy was taken. Multiple biopsies were taken. It was suspicious for adenocarcinoma. \par \par Pathology: Fragments of adenocarcinoma, at least intramucosal with foci highly suspicious of invasion. No mismatch repair present.\par \par 01/20/17: Underwent CT abdomen/pelvis: Rectosigmoid  mass,  measuring  5.5  cm  CC,  consistent  with  index  lesion,  with  lower  tumor  margin  approximately  8  cm  from  anal  verge,  within  mid-rectum.  \par   Perisigmoid  partially  necrotic  1.9  x  1.7  cm  lymph  node. 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  Right  adrenal  2  cm  nodule,  indeterminate  on  postcontrast  CT,  however  likely  represents  adenoma;\par \par She was then evaluated by surgical oncology Dr Syed, who after seeing the imaging studies, thought the lesion is more rectal and thought the patient would benefit from receiving neoadjuvant chemoRT and provided there is a good response, can achieve R0 resection.\par MRI Rectum was ordered, which is due on 01/27 and PET CT 01/30.  \par \par The patient is here to discuss her options of chemotherapy, chemoRT and further plan of care.\par \par At this time today, patient does have watery diarrhea and blood VT on wiping. Denies abdominal pain. Weight loss of about 20 lbs in past 6 months. on and off vomiting, denies nausea or fever, chills or rigors. [de-identified] : Recto sigmoid adenocarcinoma [de-identified] : CEA 3.4, CA 19-9: 66.9,  : 17 [de-identified] : CBC: 12.8, platelets: 428, wbc: 6.5\par LFT's: wnl\par Craetinine: 0.65\par Chest x ray: no evidence of acute pulmonary disease. [FreeTextEntry1] : 5FU bolus was stopped due to neutropenia.  She completed 8 cycles of Neoadjuvant FOLFOX and Completed Chemoradiation on 8/21/17. She started Adjuvant Capecitabine on January 2018.\par Completed 3/3 adjuvant capecitabine cycles  around 3/2018 [de-identified] : March 22nd 2017\par Patient returns for followup today. Since her last visit she underwent an MRI Of the rectum on 1/27/2017 that showed Polypoid rectal mass which straddles the anterior peritoneal reflection  with associated perirectal adenopathy. STAGE:  T3c N1 Mx. Necrotic perirectal lymph node noted on reference CT corresponds to a 1.9 x\par 1.7 cm left adnexal cyst on MRI.\par She also underwent a PET/CT scan In February 2, 2017 and demonstrated Intense pathologic FDG uptake (SUV 25.8 ) coregistering with rectosigmoid mass\par consistent with documented biologic tumor activity. One FDG avid (SUV 8.1) 1.1 x 1.3 cm perirectal lymph node . No other sites of abnormal FDG uptake.\par \par She was presented at our tumor board. Another discussion and review of data we decided to proceed with  neoadjuvant chemotherapy with FOLFOX for 4 months with every 2 months assessments of MRI this is to follow with chemoradiation and than TME.\par A port has been placed. She started FOLFOX and currently presents Prior to cycle 3. Her length of medication so far has been mild intermittent neuropathy and neutropenia and required delaying chemotherapy as well as discontinuation of 5-FU bolus and 3 days of Neupogen.\par \par She has no other complaints.\par \par 4/19/17\par She is doing well except for some nausea for which  Compazine helps. She had an MRI of Rectum after 4 cycles  that showed  4/10/17:  decreased size of the polypoid rectal mass, now predominantly above the peritoneal reflection measuring 3.6 cm in length,\par previously 4.8 cm.\par \par 5/17/17 \par She presents for follow up today. She is doing well. She does have fatigue, no neuropathy. She is loosing her job and disibility and bills may be a problem she is in contact with our social workers.\par \par 6/7/17\par She presents for follow up. Only complaint is fatuge. Her last cycle was delayde due to neutropenia. We dropped the dose of 5FU and Oxoli by 20% and added neupogen for 3 days.  Counts from today still show mild neutropenia. \par \par 7/10/17\par She presents for follow up today. She had MRI rectum that unfortunately showed increase in her tumor. (6/29/17: .  Since April 10, 2017, increased size of polypoid rectal mass, which now straddles the peritoneal reflection measuring 5.6 (previously 3.6cm) and increased perirectal adenopathy; desmoplastic reaction limits evaluation for\par primary tumor staging.\par  She was seen by Dr. Macias and he she was recommended to start chemoradiation. She is Doing well otherwise blood is having a hard time passing stool I recommended she continues with laxatives.\par \par 7/31/17\par Patient comes in for follow up visit, she is on day 12 of 28 of chemoradiation, patient has been having burning and itching in the vaginal and perineal areas patient tried monistat with no relief, she then tried hydrocortisone which worsened the burning. The patient also states that she has been having diarrhea, 12 episodes a day, patient has not been having good intake and is not drinking a lot of water. The patient also endorses nausea and vomiting, she states that the zofran does not help her and makes her more nauseous and the compazine makes her sleepy. Patient also states she is weaker and more tired. Her counts have slowly been trending down due to radiation. \par \par 8/7/17\par Patient comes in today for follow up, feels weak and tired, states that she is still having diarrhea but it has improved. She was prescribed immodium on the last visit which she took, but it made her constipated and when she stopped taking it, there was severe diarrhea. The patient also has nausea with some mild vomiting and dry heaves. The patient also states that she has no appetite and is not eating much. The patient continues to have burning and pruritis in the perineal and vaginal areas and was prescribed aquaphor cream by radiation with little relief. \par \par 8/14/17\par Still has 8-12 bowel movements and describes it as diarrhea, some are formed but mainly liquid but not so much.  She stopped Imodium since it made her not go at all. Still has 6 days of chemoradiation. Dizzy rarely. Drinks 16-32 oz of gatorade. Ucx was normal.\par \par 8/31/17\par She feels much better now. Skin feels better and is eating. \par \par \par 10/11/17\par She is her for follow up. She had a MR of rectum 10/2/17 that showed  Since June 29, 2017, decreased size polypoid rectal mass now measuring 4.5\par cm (previously 5.6 cm), with overall unchanged perirectal adenopathy;\par desmoplastic reaction limits evaluation for primary tumor staging.   New 0.9 cm enhancing lesion in the left iliac bone, suspicious for osseous\par metastasis; further evaluation with nuclear medicine bone scan may be of use.\par I reviewed the images with Dr. Clark and the bone finding on MR has been there on all the previous MR and have not changed in size but did enhance more. He though it may have been even a cyst. He also recommended a bone scan  to make sure. She had a Bone scan on 10/10/17 No evidence for metastatic bone disease. Specifically, no abnormal radiotracer uptake within left iliac bone.\par \par She feels well otherwise and she scheduled to undergo surgery  on October 23. Her PET/CT scan is still pending.\par \par \par 11/16/17\par She is here for follow up. She underwent  PET CT scan on 10/13/17 that did not show any distant mets. She than a underwent robotic assisted low anterior resection on October 23, 2017. 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 It was R0 resection. \par \par She was presented in our tumor board and further adjuvant chemotherapy was recommended. She saw Dr. Collins who recommended capecitabine 1250 mg/m2 bid\par \par She recovered well from surgery. She has numnbess in her feet from previous oxalipaltin. Gabapentin  is not helping.\par \par 12/4/17\par She is here for follow up.  She was seen in [facility] and they recommneded capecitabine 1500 mg BID 2 weeks on and 1 week off for 3 cycles. Given her weight loss and her bowel movements not normalizing they recommended to hold capecitabine for now, have her see PMD, see physical therapy and to have restaging PET/CT and MR Pelvis prior to restarting adjuvant chemotherapy.\par \par She does feel depressed but that is because she cant pay her bills. She did not want to see pshychiatrist. She also has a cough, dry for 4 weeks but did have a URI about 4 weeks ago. No other complaints.\par \par 1/8/18\par She is here for follow up. She had a PET CT 12/19/18 that most likely showed post surgical changes and an MR is pending next week for conformation. Emotionally she is better. She has gained weight. \par \par 1/30/18\par She is here for follow up. She had the MR of pelvis on 1/12/18: Status post lower anterior rectal resection, without MRI evidence of tumor recurrence. She was started on her 1st of 3 cycles of Capecitabine.  She developed nausea and diarrhea on her day 15 of capecitabine and stopped. She though she needs to take 21 days on and 7 days off. She only took 14 days. The nausea and vomiting and diarrhea resolve and may have been due to a viral illness. \par \par \par 2/21/18\par She is here for follow up. She is to start cycle 3 of 3 of capecitabine but has had a URI so has her foster daughter. She was seen by PMD reports negative flu and CXR and is now on Zpack. We decided to start the cape once she recovers.  She is complaining of hemorrhoids and wishes to see the Surgeon.\par \par 4/11/18\par She is doing well. Recently had a hemeroidectomy by Dr. Lopez and recovered well. She has no complaints. She completed her adjuvant cape on 3/2018\par \par 7/3/18\par She is here for follow up. Recently since about 6/22/18 she has been light headed, not dizzy. It does not happen every day. she denies any bleeding weight is stable. She had blood work from 6/2018 by PMD it was reviewed just mild decrease WBC with mild decrease in lymphs normal hgb, and platelets.\par \par 6/4/19\par She is here for follow up. She has not see un since 7/2018.  She had CT C/A/P in 7/2018 and was [facility]. She had a colonoscopy on  5/24/19 that showed  Polyp (6 mm) in the sigmoid colon. (Polypectomy).   Polyp (8 mm) in the sigmoid colon. (Polypectomy).   Moderate diverticulosis of the the left side of the colon.  Previously placed tattoo was seen around the anastomotic site.   Internal and external hemorrhoids. Path was benign.  Repeat colonoscopy  as per  Dr Scott\par \par 5/4/2020\par Patient is here for a follow-up visit for rectal cancer.  She is feeling well with no new complaints.  Most recent CBC is stable.  Patient denies fever, chills, nausea, vomiting, new pain or bleeding.  \par CT C/A/P (6.22.2019) No evidence for intrathoracic metastatic disease. Abdomen pelvis:  Interval minimal improvement of presacral post-therapy changes including minimally decreased presacral tissue  thickening, fluid and air.  No definite evidence of local tumor recurrence.  No enlarged abdominal or pelvic lymph nodes.  Stable bilateral adrenal gland adenomas.  \par She follows with Dr. Jose for SVTs. She had a colonoscopy in Nov 2019 by Dr Keating that was normal. For last 1 month she has had increase in stool frequency 2 semiformed stools a day.  She has gained weight.\par \par 5/18/21\par Patient is here for a follow-up visit for rectal cancer.  She is feeling well with no new complaints.  Most recent CBC is stable.  Patient denies fever, chills, nausea, vomiting, new pain or bleeding.  Reviewed imaging from  last year which was [facility].  She has been anxious since her PCP checked Ca19-9 due to nonspecific fatigue.  Her most recent Ca19-9 was up to 69 from 04/28/2021.  Of note, she had a cold concurrently at the time.  She was subsequently sent for CT A/P which was reviewed and also suggestive of [facility]I.  She had about 3 episodes of rectal bleeding in the last week and reports weight loss over the last week as well.  \par CT C/A/P (5.11.2020) Impression:Since June 22, 2019.No current evidence of intrathoracic disease. 1.  No definite findings of recurrent or metastatic disease in the abdomen or pelvis. 2.  Postsurgical changes of low anterior resection including chronic presacral soft tissue thickening and small focus of gas, grossly similar to prior CT. It is difficult to determine if this focus of gas is intraluminal or extraluminal. Given presence over many years, it is possible this may represent a focus of intraluminal gas in a small blind ending limb, not discernable by CT, tethered to the presacral tissues - versus sequelae of anastomotic leak.\par CT A/P (5.12.2021) IMPRESSION:1.  No findings of new or increasing metastatic disease in the abdomen or pelvis. 2.  Postsurgical changes of low anterior resection. Redemonstration of presacral edema/thickening similar to slightly decreased from prior CT, likely posttreatment related. Previously described focus of gas no longer seen.\par Labwork (4.28.2021) WBC 5.3, Hgb 12.8, MCV 94, , ironsat 17, ferritin 142, TIBC 331, Cr 0.51, [id number], CEA 2.5, Ca19-9 is 69, Ca125 is 13.4, TSH 2.6

## 2025-06-11 NOTE — ASU PREOP CHECKLIST - BP NONINVASIVE SYSTOLIC (MM HG)
Received health Link message from [facility] regarding patient calling clinic after she felt \"a pop internally around the pelvic area and is feeling discomfort\".    Called patient immediately and upon further questioning patient mentions that around 2 AM this morning she felt a popping sensation in her pelvic area that felt similar to a popping joint but deep in her uterus, describes it as sharp pain \"internally\" in her uterus area on the left side followed by warm feeling in her lower abdomen.  Upon further questioning patient is day 2 of her current menstrual period, denies any recent unprotected sexual intercourse, states last sexual intercourse was more than a year ago.  Denies possibility of being pregnant at this time.    Denies any fever however feeling light chills at the moment, denies nausea, vomiting, she has been having abdominal cramps for the past 2-3 days that she reports secondary to her menstruation, she states that this pain felt different than her regular cramps, rates pain as 4 out of 10, however felt weird with the warmth sensation in her pelvic area.    Denies any burning sensation with urination, no increased frequency or urgency, no vaginal discharges, reports history of PCOS, her last pelvic US back in 2020 reporting several small follicular cysts on both ovaries, no recent US or CAT scans.    I discussed with patient that her symptoms can be related to her menstruation cramping, however other differential and possibility would be a snapping hip or muscle strain as well as an ovarian cyst rupture although there is no known history of big ovarian cysts but only PCOS which usually associated with multiple small rather than large cysts, however given no recent imaging to rule out this I instructed patient to monitor her symptoms over the next few hours nd take as needed Tylenol or ibuprofen/NSAIDs for pain control, if no improvement or relief I encouraged patient to go to the emergency  158